# Patient Record
Sex: FEMALE | Race: BLACK OR AFRICAN AMERICAN | NOT HISPANIC OR LATINO | Employment: OTHER | URBAN - METROPOLITAN AREA
[De-identification: names, ages, dates, MRNs, and addresses within clinical notes are randomized per-mention and may not be internally consistent; named-entity substitution may affect disease eponyms.]

---

## 2017-04-11 DIAGNOSIS — H40.1192 MODERATE STAGE CHRONIC OPEN ANGLE GLAUCOMA: ICD-10-CM

## 2017-04-11 RX ORDER — LATANOPROST 50 UG/ML
SOLUTION/ DROPS OPHTHALMIC
Qty: 2.5 ML | Refills: 5 | Status: SHIPPED | OUTPATIENT
Start: 2017-04-11 | End: 2017-10-23 | Stop reason: SDUPTHER

## 2017-07-10 ENCOUNTER — OFFICE VISIT (OUTPATIENT)
Dept: OPHTHALMOLOGY | Facility: CLINIC | Age: 82
End: 2017-07-10
Payer: MEDICARE

## 2017-07-10 DIAGNOSIS — H35.9 MACULOPATHY: ICD-10-CM

## 2017-07-10 DIAGNOSIS — H40.1132 PRIMARY OPEN ANGLE GLAUCOMA OF BOTH EYES, MODERATE STAGE: Primary | ICD-10-CM

## 2017-07-10 PROCEDURE — 92133 CPTRZD OPH DX IMG PST SGM ON: CPT | Mod: S$GLB,,, | Performed by: OPHTHALMOLOGY

## 2017-07-10 PROCEDURE — 99999 PR PBB SHADOW E&M-EST. PATIENT-LVL II: CPT | Mod: PBBFAC,,, | Performed by: OPHTHALMOLOGY

## 2017-07-10 PROCEDURE — 92014 COMPRE OPH EXAM EST PT 1/>: CPT | Mod: S$GLB,,, | Performed by: OPHTHALMOLOGY

## 2017-07-10 NOTE — PROGRESS NOTES
===============================  07/10/2017   Tiffany Barnett,   89 y.o. female   Last visit [facility]: :12/9/2016   Last visit eye dept. Visit date not found  VA:  Corrected distance visual acuity was 20/50 +1 in the right eye and 20/400 in the left eye.  Tonometry     Tonometry (Applanation, 12:47 PM)       Right Left    Pressure 16 14              Wearing Rx     Wearing Rx       Sphere Cylinder Axis Add    Right -1.00 +0.50 085 +2.75    Left -1.00 Sphere  +2.75    Type:  Bifocal              Manifest Refraction     Manifest Refraction       Sphere Cylinder Axis Dist VA Add    Right -0.50 +0.50 085 20/30-2 +2.75    Left -1.00 Sphere  20/400 +2.75              Chief Complaint   Patient presents with    Glaucoma     Lost to f/u, last visit 12/9/16, Can't see as good as I used to.     Ophthalmic Medications     Ophthalmic-Intraocular Pressure Reducing Agents, Prostaglandin Analogs Start End    latanoprost 0.005 % ophthalmic solution 4/11/2017     Sig: INSTILL ONE DROP  INTO EACH EYE IN THE EVENING         HPI     Glaucoma    Additional comments: Lost to f/u, last visit 12/9/16, Can't see as good   as I used to.           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 (History)      Read Studies: y  Vitalsy  ________________  7/10/2017  Problem List Items Addressed This Visit        Eye/Vision problems    Primary open angle glaucoma - Primary    Relevant Orders    Posterior Segment OCT Optic Nerve- Both eyes (Completed)      Other Visit Diagnoses     Maculopathy            .dilated exam today stable, C:D OD 0.7-8 thin inf temp. OS 0.8 tilted disc  . bpc w orbital fat prolapse - follow   T 19 ou  goct today od rnfl low temp worse, os low but old    Continue latanoprost ou qhs, any  higher next visit, consider Cosopt    rtc 3 months, repeat vf and IOP check         ===========================

## 2017-10-23 ENCOUNTER — OFFICE VISIT (OUTPATIENT)
Dept: OPHTHALMOLOGY | Facility: CLINIC | Age: 82
End: 2017-10-23
Payer: MEDICARE

## 2017-10-23 DIAGNOSIS — H40.1132 PRIMARY OPEN ANGLE GLAUCOMA OF BOTH EYES, MODERATE STAGE: Primary | ICD-10-CM

## 2017-10-23 PROCEDURE — 99999 PR PBB SHADOW E&M-EST. PATIENT-LVL II: CPT | Mod: PBBFAC,,, | Performed by: OPHTHALMOLOGY

## 2017-10-23 PROCEDURE — 92012 INTRM OPH EXAM EST PATIENT: CPT | Mod: S$GLB,,, | Performed by: OPHTHALMOLOGY

## 2017-10-23 PROCEDURE — 92083 EXTENDED VISUAL FIELD XM: CPT | Mod: S$GLB,,, | Performed by: OPHTHALMOLOGY

## 2017-10-23 RX ORDER — DORZOLAMIDE HYDROCHLORIDE AND TIMOLOL MALEATE 20; 5 MG/ML; MG/ML
1 SOLUTION/ DROPS OPHTHALMIC 2 TIMES DAILY
Qty: 5 ML | Refills: 6 | Status: SHIPPED | OUTPATIENT
Start: 2017-10-23 | End: 2018-01-15 | Stop reason: SDUPTHER

## 2017-10-23 RX ORDER — LATANOPROST 50 UG/ML
1 SOLUTION/ DROPS OPHTHALMIC NIGHTLY
Qty: 2.5 ML | Refills: 5 | Status: SHIPPED | OUTPATIENT
Start: 2017-10-23 | End: 2018-01-15 | Stop reason: SDUPTHER

## 2017-10-23 NOTE — PROGRESS NOTES
===============================  10/24/2017   Tiffany Barnett,   89 y.o. female   Last visit [facility]: :7/10/2017   Last visit eye dept. 7/10/2017  VA:  Corrected distance visual acuity was 20/40 in the right eye and 20/400 in the left eye.   Not recorded         Not recorded         Not recorded        Chief Complaint   Patient presents with    Glaucoma     REV HVF/  3 MONTH IOP CHECK     Ophthalmic Medications     Ophthalmic-Intraocular Pressure Reducing Agents, Prostaglandin Analogs Start End    latanoprost 0.005 % ophthalmic solution 10/23/2017 10/23/2018    Sig: Place 1 drop into both eyes every evening.    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 (History)          ________________  10/23/2017  Problem List Items Addressed This Visit        Eye/Vision problems    Primary open angle glaucoma - Primary    Relevant Medications    latanoprost 0.005 % ophthalmic solution    dorzolamide-timolol 2-0.5% (COSOPT) 22.3-6.8 mg/mL ophthalmic solution    Other Relevant Orders    Gallo Visual Field - OU - Extended - Both Eyes (Completed)      od worse generalized constricton od   Os much worse but os maculopathy  Sp slt    [facility]  Add cosopt -  rtc 10 weeks    .       ============================

## 2018-01-15 ENCOUNTER — OFFICE VISIT (OUTPATIENT)
Dept: OPHTHALMOLOGY | Facility: CLINIC | Age: 83
End: 2018-01-15
Payer: MEDICARE

## 2018-01-15 DIAGNOSIS — H40.1132 PRIMARY OPEN ANGLE GLAUCOMA OF BOTH EYES, MODERATE STAGE: ICD-10-CM

## 2018-01-15 DIAGNOSIS — H40.1132 PRIMARY OPEN ANGLE GLAUCOMA (POAG) OF BOTH EYES, MODERATE STAGE: Primary | ICD-10-CM

## 2018-01-15 PROCEDURE — 99999 PR PBB SHADOW E&M-EST. PATIENT-LVL III: CPT | Mod: PBBFAC,,, | Performed by: OPHTHALMOLOGY

## 2018-01-15 PROCEDURE — 92012 INTRM OPH EXAM EST PATIENT: CPT | Mod: S$GLB,,, | Performed by: OPHTHALMOLOGY

## 2018-01-15 RX ORDER — DORZOLAMIDE HYDROCHLORIDE AND TIMOLOL MALEATE 20; 5 MG/ML; MG/ML
1 SOLUTION/ DROPS OPHTHALMIC 2 TIMES DAILY
Qty: 3 BOTTLE | Refills: 2 | Status: SHIPPED | OUTPATIENT
Start: 2018-01-15 | End: 2018-02-05 | Stop reason: RX

## 2018-01-15 RX ORDER — LATANOPROST 50 UG/ML
1 SOLUTION/ DROPS OPHTHALMIC NIGHTLY
Qty: 7.5 ML | Refills: 2 | Status: SHIPPED | OUTPATIENT
Start: 2018-01-15 | End: 2018-09-24 | Stop reason: SDUPTHER

## 2018-01-15 NOTE — PROGRESS NOTES
===============================  01/15/2018   Tiffany Barnett,   89 y.o. female   Last visit [facility]: :10/23/2017   Last visit eye dept. 10/23/2017  VA:  Corrected distance visual acuity was 20/40 in the right eye and CF at 3' in the left eye.  Tonometry     Tonometry (Applanation, 10:03 AM)       Right Left    Pressure 16 16               Not recorded         Not recorded        Chief Complaint   Patient presents with    Glaucoma     10 WEEK COAG CHECK UP/       Ophthalmic Medications     Ophthalmic-Intraocular Pressure Reducing Agents, Prostaglandin Analogs Start End    latanoprost 0.005 % ophthalmic solution 1/15/2018 1/15/2019    Sig: Place 1 drop into both eyes every evening.    Route: Both Eyes    latanoprost 0.005 % ophthalmic solution (Discontinued) 10/23/2017 1/15/2018    Sig: Place 1 drop into both eyes every evening.    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 (History)          ________________  1/15/2018  Problem List Items Addressed This Visit        Eye/Vision problems    Primary open angle glaucoma - Primary    Relevant Medications    latanoprost 0.005 % ophthalmic solution    dorzolamide-timolol 2-0.5% (COSOPT) 22.3-6.8 mg/mL ophthalmic solution      Other Visit Diagnoses     Primary open angle glaucoma of both eyes, moderate stage            T 16 16   Good!  Continue same drops (3 month supply refills sent to [facility] as per patient request)  rtc 4 months    .       ===========================

## 2018-02-05 ENCOUNTER — TELEPHONE (OUTPATIENT)
Dept: OPHTHALMOLOGY | Facility: CLINIC | Age: 83
End: 2018-02-05

## 2018-02-05 RX ORDER — DORZOLAMIDE HCL 20 MG/ML
1 SOLUTION/ DROPS OPHTHALMIC 2 TIMES DAILY
Qty: 5 ML | Refills: 5 | Status: SHIPPED | OUTPATIENT
Start: 2018-02-05 | End: 2018-09-24 | Stop reason: SDUPTHER

## 2018-02-05 RX ORDER — TIMOLOL MALEATE 5 MG/ML
1 SOLUTION/ DROPS OPHTHALMIC 2 TIMES DAILY
Qty: 5 ML | Refills: 5 | Status: SHIPPED | OUTPATIENT
Start: 2018-02-05 | End: 2018-09-24 | Stop reason: SDUPTHER

## 2018-04-20 ENCOUNTER — TELEPHONE (OUTPATIENT)
Dept: OPHTHALMOLOGY | Facility: CLINIC | Age: 83
End: 2018-04-20

## 2018-04-20 NOTE — TELEPHONE ENCOUNTER
Spoke with Mrs. Barnett's daughter, Florinda, regarding her eyes. For the last 2 days, Tiffany has been having red and itchy eyes. She's only using Latanoprost at this time and wanted to know if something should be called into the pharmacy. I explained to Florinda Dr. Ngo and his staff are out this afternoon but until I relay the message to them, she can use an over the counter drop, Zaditor, twice a day, along with AT's as needed. I will send a message to Dr. Ngo's staff to let them know what's going on just in case he may want her to come in before her scheduled appointment time. Florinda will try the Zaditor and tears and will call us Monday if there is no improvement.     ----- Message from Bonnie Garcia sent at 4/20/2018  3:32 PM CDT -----  Contact: Florinda (pt's daughter)   Florinda called and stated she needed to speak to the nurse to the nurse. She stated that the pt has red eyes and needs to know if she should schedule an appt or have something called in to her pharmacy. She can be reached at [phone] (home).    Thanks,  TF

## 2018-05-14 ENCOUNTER — OFFICE VISIT (OUTPATIENT)
Dept: OPHTHALMOLOGY | Facility: CLINIC | Age: 83
End: 2018-05-14
Payer: MEDICARE

## 2018-05-14 DIAGNOSIS — H40.1132 PRIMARY OPEN ANGLE GLAUCOMA (POAG) OF BOTH EYES, MODERATE STAGE: Primary | ICD-10-CM

## 2018-05-14 PROCEDURE — 99999 PR PBB SHADOW E&M-EST. PATIENT-LVL II: CPT | Mod: PBBFAC,,, | Performed by: OPHTHALMOLOGY

## 2018-05-14 PROCEDURE — 92012 INTRM OPH EXAM EST PATIENT: CPT | Mod: S$GLB,,, | Performed by: OPHTHALMOLOGY

## 2018-05-14 NOTE — PROGRESS NOTES
===============================  05/14/2018   Tiffany Barnett,   89 y.o. female   Last visit [facility]: :1/15/2018   Last visit eye dept. 1/15/2018  VA:  Corrected distance visual acuity was 20/30 in the right eye and CF at 3' in the left eye.  Tonometry     Tonometry (Applanation, 12:17 PM)       Right Left    Pressure 16 17               Not recorded         Not recorded        Chief Complaint   Patient presents with    Glaucoma     4 month iop check     Ophthalmic Medications     Ophthalmic - Intraocular Pressure Reducing Agents, Beta-blockers Start End    timolol maleate 0.5% (TIMOPTIC) 0.5 % Drop 2/5/2018 2/5/2019    Sig: Place 1 drop into both eyes 2 (two) times daily.    Route: Both Eyes    Ophthalmic-Intraocular Pressure Reducing Agents, Prostaglandin Analogs Start End    latanoprost 0.005 % ophthalmic solution 1/15/2018 1/15/2019    Sig: Place 1 drop into both eyes every evening.    Route: Both Eyes    Ophthalmic - Carbonic Anhydrase Inhibitors Start End    dorzolamide (TRUSOPT) 2 % ophthalmic solution 2/5/2018 2/5/2019    Sig: Place 1 drop into both eyes 2 (two) times daily.    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 (History)          ________________  5/14/2018  Problem List Items Addressed This Visit        Eye/Vision problems    Primary open angle glaucoma - Primary          .Good IOP  Continue Xalatan and cosopt  rtc 4 months iop check       ===========================

## 2018-09-24 ENCOUNTER — OFFICE VISIT (OUTPATIENT)
Dept: OPHTHALMOLOGY | Facility: CLINIC | Age: 83
End: 2018-09-24
Payer: MEDICARE

## 2018-09-24 DIAGNOSIS — H40.1132 PRIMARY OPEN ANGLE GLAUCOMA (POAG) OF BOTH EYES, MODERATE STAGE: Primary | ICD-10-CM

## 2018-09-24 PROCEDURE — 99212 OFFICE O/P EST SF 10 MIN: CPT | Mod: PBBFAC,PO | Performed by: OPHTHALMOLOGY

## 2018-09-24 PROCEDURE — 92012 INTRM OPH EXAM EST PATIENT: CPT | Mod: S$PBB,,, | Performed by: OPHTHALMOLOGY

## 2018-09-24 PROCEDURE — 99999 PR PBB SHADOW E&M-EST. PATIENT-LVL II: CPT | Mod: PBBFAC,,, | Performed by: OPHTHALMOLOGY

## 2018-09-24 RX ORDER — DORZOLAMIDE HYDROCHLORIDE AND TIMOLOL MALEATE 20; 5 MG/ML; MG/ML
1 SOLUTION/ DROPS OPHTHALMIC 2 TIMES DAILY
Qty: 10 ML | Refills: 9 | Status: SHIPPED | OUTPATIENT
Start: 2018-09-24 | End: 2018-09-24 | Stop reason: SDUPTHER

## 2018-09-24 RX ORDER — LATANOPROST 50 UG/ML
1 SOLUTION/ DROPS OPHTHALMIC NIGHTLY
Qty: 7.5 ML | Refills: 2 | Status: SHIPPED | OUTPATIENT
Start: 2018-09-24 | End: 2019-09-24

## 2018-09-24 RX ORDER — DORZOLAMIDE HYDROCHLORIDE AND TIMOLOL MALEATE 20; 5 MG/ML; MG/ML
1 SOLUTION/ DROPS OPHTHALMIC 2 TIMES DAILY
Qty: 10 ML | Refills: 9 | Status: SHIPPED | OUTPATIENT
Start: 2018-09-24 | End: 2019-10-09

## 2018-09-24 NOTE — PROGRESS NOTES
===============================  09/24/2018   Tiffany Barnett,   89 y.o. female   Last visit [facility]: :5/14/2018   Last visit eye dept. 5/14/2018  VA:  Corrected distance visual acuity was 20/25 -1 in the right eye and CF@5' in the left eye.  Tonometry     Tonometry (Applanation, 2:24 PM)       Right Left    Pressure 10 17              Wearing Rx     Wearing Rx       Sphere Cylinder Axis Add    Right -1.00 +0.50 085 +2.75    Left -1.00 Sphere  +2.75    Type:  Bifocal               Not recorded        Chief Complaint   Patient presents with    Glaucoma     4 month IOP check Latanoprost QHS OU. Per patient, she's only using one drop once a day(Xalatan). Vision is the same. No ocular pain or irritation.      Ophthalmic Medications     Ophthalmic - Intraocular Pressure Reducing Agents, Beta-blockers Start End    timolol maleate 0.5% (TIMOPTIC) 0.5 % Drop (Discontinued) 2/5/2018 9/24/2018    Sig: Place 1 drop into both eyes 2 (two) times daily.    Route: Both Eyes    Reason for Discontinue: Duplicate Order    Ophthalmic-Intraocular Pressure Reducing Agents, Prostaglandin Analogs Start End    latanoprost 0.005 % ophthalmic solution 1/15/2018 1/15/2019    Sig: Place 1 drop into both eyes every evening.    Route: Both Eyes    Ophthalmic - Carbonic Anhydrase Inhibitors Start End    dorzolamide (TRUSOPT) 2 % ophthalmic solution (Discontinued) 2/5/2018 9/24/2018    Sig: Place 1 drop into both eyes 2 (two) times daily.    Route: Both Eyes    Reason for Discontinue: Duplicate Order         HPI     Glaucoma      Additional comments: 4 month IOP check Latanoprost QHS OU. Per patient,   she's only using one drop once a day(Xalatan). Vision is the same. No   ocular pain or irritation.               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 (History)          ________________  9/24/2018  Problem List Items Addressed This Visit        Eye/Vision problems    Primary open angle glaucoma - Primary          Rt 2-3 moths  .  Was jose alfredo ware of cosopt  t 10  17  Resume cosopt     rx [facility] and cosopt ioop checkk 10 weeks     ===========================

## 2018-12-04 ENCOUNTER — OFFICE VISIT (OUTPATIENT)
Dept: OPHTHALMOLOGY | Facility: CLINIC | Age: 83
End: 2018-12-04
Payer: MEDICARE

## 2018-12-04 DIAGNOSIS — H35.3134 ADVANCED ATROPHIC NONEXUDATIVE AGE-RELATED MACULAR DEGENERATION OF BOTH EYES WITH SUBFOVEAL INVOLVEMENT: ICD-10-CM

## 2018-12-04 DIAGNOSIS — H40.1132 PRIMARY OPEN ANGLE GLAUCOMA (POAG) OF BOTH EYES, MODERATE STAGE: Primary | ICD-10-CM

## 2018-12-04 PROCEDURE — 92012 INTRM OPH EXAM EST PATIENT: CPT | Mod: S$GLB,,, | Performed by: OPHTHALMOLOGY

## 2018-12-04 PROCEDURE — 99999 PR PBB SHADOW E&M-EST. PATIENT-LVL II: CPT | Mod: PBBFAC,,, | Performed by: OPHTHALMOLOGY

## 2018-12-04 RX ORDER — ASPIRIN 325 MG
TABLET, DELAYED RELEASE (ENTERIC COATED) ORAL
COMMUNITY
Start: 2018-11-18

## 2018-12-04 NOTE — PROGRESS NOTES
===============================  12/04/2018   Tiffany Barnett,   89 y.o. female   Last visit [facility]: :9/24/2018   Last visit eye dept. 9/24/2018  VA:  Corrected distance visual acuity was 20/25 in the right eye and CF at 3' in the left eye.  Tonometry     Tonometry (Applanation, 10:42 AM)       Right Left    Pressure 18 17               Not recorded         Not recorded        Chief Complaint   Patient presents with    Glaucoma     10 week iop check     Ophthalmic Medications     Ophthalmic-Intraocular Pressure Reducing Agents, Prostaglandin Analogs Start End    latanoprost 0.005 % ophthalmic solution 9/24/2018 9/24/2019    Sig: Place 1 drop into both eyes every evening.    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

## 2019-04-22 ENCOUNTER — TELEPHONE (OUTPATIENT)
Dept: OPHTHALMOLOGY | Facility: CLINIC | Age: 84
End: 2019-04-22

## 2019-04-22 NOTE — TELEPHONE ENCOUNTER
----- Message from Alea Barragan sent at 4/22/2019  9:45 AM CDT -----  Contact: [phone]   Pt would like to reschedule appt.  Please call back at [phone]. Thx-

## 2019-04-30 ENCOUNTER — OFFICE VISIT (OUTPATIENT)
Dept: OPHTHALMOLOGY | Facility: CLINIC | Age: 84
End: 2019-04-30
Payer: MEDICARE

## 2019-04-30 DIAGNOSIS — H40.1132 PRIMARY OPEN ANGLE GLAUCOMA (POAG) OF BOTH EYES, MODERATE STAGE: Primary | ICD-10-CM

## 2019-04-30 PROCEDURE — 99999 PR PBB SHADOW E&M-EST. PATIENT-LVL II: ICD-10-PCS | Mod: PBBFAC,,, | Performed by: OPHTHALMOLOGY

## 2019-04-30 PROCEDURE — 92014 PR EYE EXAM, EST PATIENT,COMPREHESV: ICD-10-PCS | Mod: S$GLB,,, | Performed by: OPHTHALMOLOGY

## 2019-04-30 PROCEDURE — 92133 POSTERIOR SEGMENT OCT OPTIC NERVE(OCULAR COHERENCE TOMOGRAPHY) - OU - BOTH EYES: ICD-10-PCS | Mod: S$GLB,,, | Performed by: OPHTHALMOLOGY

## 2019-04-30 PROCEDURE — 92133 CPTRZD OPH DX IMG PST SGM ON: CPT | Mod: S$GLB,,, | Performed by: OPHTHALMOLOGY

## 2019-04-30 PROCEDURE — 92014 COMPRE OPH EXAM EST PT 1/>: CPT | Mod: S$GLB,,, | Performed by: OPHTHALMOLOGY

## 2019-04-30 PROCEDURE — 92083 HUMPHREY VISUAL FIELD - OU - BOTH EYES: ICD-10-PCS | Mod: S$GLB,,, | Performed by: OPHTHALMOLOGY

## 2019-04-30 PROCEDURE — 92083 EXTENDED VISUAL FIELD XM: CPT | Mod: S$GLB,,, | Performed by: OPHTHALMOLOGY

## 2019-04-30 PROCEDURE — 99999 PR PBB SHADOW E&M-EST. PATIENT-LVL II: CPT | Mod: PBBFAC,,, | Performed by: OPHTHALMOLOGY

## 2019-04-30 RX ORDER — BRIMONIDINE TARTRATE 2 MG/ML
1 SOLUTION/ DROPS OPHTHALMIC 3 TIMES DAILY
Qty: 5 ML | Refills: 5 | Status: SHIPPED | OUTPATIENT
Start: 2019-04-30 | End: 2019-10-09 | Stop reason: SDUPTHER

## 2019-04-30 NOTE — PROGRESS NOTES
===============================  04/30/2019   Tiffany Barnett,   89 y.o. female   Last visit [facility]: :12/4/2018   Last visit eye dept. Visit date not found  VA:  Corrected distance visual acuity was 20/25 in the right eye and CF at 3' in the left eye.  Tonometry     Tonometry (Applanation, 11:22 AM)       Right Left    Pressure 19 18               Not recorded         Not recorded        Chief Complaint   Patient presents with    Glaucoma     iop ck / vf review     Ophthalmic Medications     Ophthalmic-Intraocular Pressure Reducing Agents, Prostaglandin Analogs Start End     latanoprost 0.005 % ophthalmic solution    9/24/2018 9/24/2019    Sig: Place 1 drop into both eyes every evening.    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 (History)        ________________  4/30/2019  Problem List Items Addressed This Visit        Eye/Vision problems    Primary open angle glaucoma - Primary    Relevant Orders    Gallo Visual Field - OU - Extended - Both Eyes    Posterior Segment OCT Optic Nerve- Both eyes          .OD increased constriction, new jxf micro scotoma  OS marked worsening secondary to smd    Change glaucoma drops secondary worse vf   Discuss with Dr. Kowalski - he did SLT in 2014    consder os CE - guadre proegnisi for imprvemmet    Known oS MH       Revise drop duiscss w cpg then rtc to eval drops and OS lens  Add alphagan  rtc 3-4 weeks with Dr. Kowalski, brandon IOP and discuss OS Cataract Surgery  ===========================

## 2019-05-24 ENCOUNTER — OFFICE VISIT (OUTPATIENT)
Dept: OPHTHALMOLOGY | Facility: CLINIC | Age: 84
End: 2019-05-24
Payer: MEDICARE

## 2019-05-24 DIAGNOSIS — H40.1132 PRIMARY OPEN ANGLE GLAUCOMA (POAG) OF BOTH EYES, MODERATE STAGE: ICD-10-CM

## 2019-05-24 DIAGNOSIS — H25.12 NUCLEAR SCLEROSIS OF LEFT EYE: Primary | ICD-10-CM

## 2019-05-24 DIAGNOSIS — Z96.1 PSEUDOPHAKIA OF RIGHT EYE: ICD-10-CM

## 2019-05-24 PROCEDURE — 92136 OPHTHALMIC BIOMETRY: CPT | Mod: LT,S$GLB,, | Performed by: OPHTHALMOLOGY

## 2019-05-24 PROCEDURE — 99999 PR PBB SHADOW E&M-EST. PATIENT-LVL II: ICD-10-PCS | Mod: PBBFAC,,, | Performed by: OPHTHALMOLOGY

## 2019-05-24 PROCEDURE — 92012 PR EYE EXAM, EST PATIENT,INTERMED: ICD-10-PCS | Mod: S$GLB,,, | Performed by: OPHTHALMOLOGY

## 2019-05-24 PROCEDURE — 92020 PR SPECIAL EYE EVAL,GONIOSCOPY: ICD-10-PCS | Mod: S$GLB,,, | Performed by: OPHTHALMOLOGY

## 2019-05-24 PROCEDURE — 92136 IOL MASTER - OS - LEFT EYE: ICD-10-PCS | Mod: LT,S$GLB,, | Performed by: OPHTHALMOLOGY

## 2019-05-24 PROCEDURE — 99999 PR PBB SHADOW E&M-EST. PATIENT-LVL II: CPT | Mod: PBBFAC,,, | Performed by: OPHTHALMOLOGY

## 2019-05-24 PROCEDURE — 92012 INTRM OPH EXAM EST PATIENT: CPT | Mod: S$GLB,,, | Performed by: OPHTHALMOLOGY

## 2019-05-24 PROCEDURE — 92020 GONIOSCOPY: CPT | Mod: S$GLB,,, | Performed by: OPHTHALMOLOGY

## 2019-05-24 RX ORDER — GATIFLOXACIN 5 MG/ML
1 SOLUTION/ DROPS OPHTHALMIC 2 TIMES DAILY
Qty: 1 BOTTLE | Refills: 2 | Status: SHIPPED | OUTPATIENT
Start: 2019-05-24 | End: 2019-10-09

## 2019-05-24 RX ORDER — PREDNISOLONE ACETATE 10 MG/ML
1 SUSPENSION/ DROPS OPHTHALMIC 4 TIMES DAILY
Qty: 1 BOTTLE | Refills: 2 | Status: SHIPPED | OUTPATIENT
Start: 2019-05-24 | End: 2019-10-09

## 2019-05-24 RX ORDER — KETOROLAC TROMETHAMINE 5 MG/ML
1 SOLUTION OPHTHALMIC 4 TIMES DAILY
Qty: 1 BOTTLE | Refills: 2 | Status: SHIPPED | OUTPATIENT
Start: 2019-05-24 | End: 2019-10-09

## 2019-05-24 NOTE — PROGRESS NOTES
SUBJECTIVE:   Tiffany Barnett is a 89 y.o. female   Corrected distance visual acuity was 20/25 +2 in the right eye and CF at 3' in the left eye.   Chief Complaint   Patient presents with    Glaucoma        HPI:  HPI     Pt here for IOP and OS cat eval per [facility]. No pain or discomfort. VA   stable. 100% compliant with gtts.     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 (History)        Assessment /Plan :  1. Nuclear sclerosis of left eye  Visually Significant Cataract OS:   Patient reports decreased vision consistent with the clinical amount of lenticular opacity,  which reaches the level of visual significance and affects activities of daily living such as  read. Risks, benefits, and alternatives to cataract surgery were  discussed.  IOL options were discussed, including Premium IOL'S and the associated  side effects and additional patient cost associated with them as well as patient's visual  goals. The pt expressed a desire to proceed with surgery with the potential for some  reasonable degree of visual improvement and was consented.  Risks of loss of vision and eye reviewed as well as possibility of need for spectacle correction after surgery even with premium implants. Verbal and written preop  instructions were provided to the patient.       Pt is interested in traditional monofocal IOL aiming for:    Distance OU and understands that glasses will be generally needed at all times for near vision and often for finer distance correction especially for higher degrees of astigmatism.       Final visual acuity may be limited by retina and astigmatism    Pt wishes to have Phaco/IOL  OS     Requests a Monofocal IOL.    Will aim for distance    Other considerations: Corneal Precautions, Trypan Blue and Complex  Cataract     2. Pseudophakia of right eye stable   3. Primary open angle glaucoma (POAG) of both eyes, moderate stage Doing well, IOP within acceptable range relative to target IOP and no evidence of progression. Continue current treatment. Reviewed importance of continued compliance with treatment and follow up.

## 2019-06-13 ENCOUNTER — TELEPHONE (OUTPATIENT)
Dept: OPHTHALMOLOGY | Facility: CLINIC | Age: 84
End: 2019-06-13

## 2019-07-17 ENCOUNTER — OUTSIDE PLACE OF SERVICE (OUTPATIENT)
Dept: ADMINISTRATIVE | Facility: OTHER | Age: 84
End: 2019-07-17
Payer: MEDICARE

## 2019-07-17 PROCEDURE — 66982 PR REMOVAL, CATARACT, W/INSRT INTRAOC LENS, W/O ENDO CYCLO, CMPLX: ICD-10-PCS | Mod: LT,,, | Performed by: OPHTHALMOLOGY

## 2019-07-17 PROCEDURE — 66982 XCAPSL CTRC RMVL CPLX WO ECP: CPT | Mod: LT,,, | Performed by: OPHTHALMOLOGY

## 2019-07-18 ENCOUNTER — OFFICE VISIT (OUTPATIENT)
Dept: OPHTHALMOLOGY | Facility: CLINIC | Age: 84
End: 2019-07-18
Payer: MEDICARE

## 2019-07-18 DIAGNOSIS — Z98.890 POST-OPERATIVE STATE: Primary | ICD-10-CM

## 2019-07-18 PROCEDURE — 99999 PR PBB SHADOW E&M-EST. PATIENT-LVL II: ICD-10-PCS | Mod: PBBFAC,,, | Performed by: OPHTHALMOLOGY

## 2019-07-18 PROCEDURE — 99024 PR POST-OP FOLLOW-UP VISIT: ICD-10-PCS | Mod: S$GLB,,, | Performed by: OPHTHALMOLOGY

## 2019-07-18 PROCEDURE — 99999 PR PBB SHADOW E&M-EST. PATIENT-LVL II: CPT | Mod: PBBFAC,,, | Performed by: OPHTHALMOLOGY

## 2019-07-18 PROCEDURE — 99024 POSTOP FOLLOW-UP VISIT: CPT | Mod: S$GLB,,, | Performed by: OPHTHALMOLOGY

## 2019-07-18 NOTE — PROGRESS NOTES
SUBJECTIVE:   Tiffany Barnett is a 89 y.o. female   Uncorrected distance visual acuity was not recorded in the right eye and CF at 3' in the left eye.   Chief Complaint   Patient presents with    Post-op Evaluation     1 DAY PCIOL OS        HPI:  HPI     Post-op Evaluation      Additional comments: 1 DAY PCIOL OS              Comments     The patient states her left eye is feeling fine and she denies any ocular   pain at this time.  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 (History)        Assessment /Plan :  1. Post-operative state Exam:  SLE:  S/C: normal  K : trace k fold  AC: trace cell and flare  Iris: normal  Lens: PCIOL    IMP:  PO Day 1 S/P Phaco/IOL OS : Doing well.    Plan:  Continue gtts to operative eye:  Pred 1% QID  Ketorolac QID  Zymaxid BID  Reinstructed in importance of absolute compliance with Post-OP instructions including medications, shield at bedtime, and limitation of activities. Follow up appointments in approximately one and six weeks or call immediately for increased pain, redness or vision loss.

## 2019-07-25 ENCOUNTER — OFFICE VISIT (OUTPATIENT)
Dept: OPHTHALMOLOGY | Facility: CLINIC | Age: 84
End: 2019-07-25
Payer: MEDICARE

## 2019-07-25 DIAGNOSIS — Z98.890 POST-OPERATIVE STATE: Primary | ICD-10-CM

## 2019-07-25 PROCEDURE — 99999 PR PBB SHADOW E&M-EST. PATIENT-LVL II: ICD-10-PCS | Mod: PBBFAC,,, | Performed by: OPHTHALMOLOGY

## 2019-07-25 PROCEDURE — 99024 PR POST-OP FOLLOW-UP VISIT: ICD-10-PCS | Mod: S$GLB,,, | Performed by: OPHTHALMOLOGY

## 2019-07-25 PROCEDURE — 99999 PR PBB SHADOW E&M-EST. PATIENT-LVL II: CPT | Mod: PBBFAC,,, | Performed by: OPHTHALMOLOGY

## 2019-07-25 PROCEDURE — 99024 POSTOP FOLLOW-UP VISIT: CPT | Mod: S$GLB,,, | Performed by: OPHTHALMOLOGY

## 2019-07-25 NOTE — PROGRESS NOTES
SUBJECTIVE:   Tiffany Barnett is a 89 y.o. female   Uncorrected distance visual acuity was not recorded in the right eye and CF at 3' in the left eye.   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 (History)        Assessment /Plan :  1. Post-operative state Slit lamp exam:  L/L: nl  K: clear, wound sealed  AC: 0 cell and flare  Iris/Lens: IOL centered and stable      IMP:  PO Week 1 S/P Phaco/ IOL OS : Doing well with no evidence of infection or abnormal inflammation.     Plan:  Pt given and instructed in one week PO instructions. D/C zymaxid and start to taper off Ketorlac and Pred Forte 1% weekly. Can resume normal activitites and d/c eye shield. OTC reading glasses can be used until evaluated for final MR. Follow up in one month or PRN pain, redness, vision loss, or other concerns.

## 2019-08-29 ENCOUNTER — OFFICE VISIT (OUTPATIENT)
Dept: OPHTHALMOLOGY | Facility: CLINIC | Age: 84
End: 2019-08-29
Payer: MEDICARE

## 2019-08-29 DIAGNOSIS — H40.1132 PRIMARY OPEN ANGLE GLAUCOMA (POAG) OF BOTH EYES, MODERATE STAGE: ICD-10-CM

## 2019-08-29 DIAGNOSIS — Z98.890 POST-OPERATIVE STATE: Primary | ICD-10-CM

## 2019-08-29 PROCEDURE — 99024 POSTOP FOLLOW-UP VISIT: CPT | Mod: S$GLB,,, | Performed by: OPHTHALMOLOGY

## 2019-08-29 PROCEDURE — 99999 PR PBB SHADOW E&M-EST. PATIENT-LVL II: CPT | Mod: PBBFAC,,, | Performed by: OPHTHALMOLOGY

## 2019-08-29 PROCEDURE — 99024 PR POST-OP FOLLOW-UP VISIT: ICD-10-PCS | Mod: S$GLB,,, | Performed by: OPHTHALMOLOGY

## 2019-08-29 PROCEDURE — 99999 PR PBB SHADOW E&M-EST. PATIENT-LVL II: ICD-10-PCS | Mod: PBBFAC,,, | Performed by: OPHTHALMOLOGY

## 2019-08-29 RX ORDER — LATANOPROST 50 UG/ML
1 SOLUTION/ DROPS OPHTHALMIC NIGHTLY
Qty: 1 BOTTLE | Refills: 12 | Status: SHIPPED | OUTPATIENT
Start: 2019-08-29 | End: 2020-03-02 | Stop reason: SDUPTHER

## 2019-08-29 NOTE — PROGRESS NOTES
"SUBJECTIVE:   Tiffany Barnett is a 89 y.o. female   Uncorrected distance visual acuity was not recorded in the right eye and CF at 4 " in the left eye.   Chief Complaint   Patient presents with    Post-op Evaluation     S/P Phaco with IOL OS (07/17/19)        HPI:  HPI     Post-op Evaluation      Additional comments: S/P Phaco with IOL OS (07/17/19)              Comments     Patient feels OS is doing well, denies any pain or irritation and has   discontinued drops and directed.  Patient is still using Alphagan in OD.      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 (History)        Assessment /Plan :  1. Post-operative state   Exam:    Slit lamp exam:  L/L: nl  S/C: quiet and uninflamed  K: clear, wound sealed  AC: no cell or flare  Iris/Lens: IOL centered and stable      Assessment:    PO Month 1 S/P Phaco/IOL OS : Doing Well and completing healing process. Final visual correction options discussed and appropriate prescriptions and/or OTC reading glass recommendations offered to patient.    2. Primary open angle glaucoma (POAG) of both eyes, moderate stage IOP not within acceptable range relative to target IOP with risk of irreversible visual loss. Better IOP control is recommended. Discussed options, risks, and benefits of additional medication, SLT laser, and/or incisional glaucoma surgery. Reviewed importance of continued compliance with treatment and follow up.     Patient chooses Add Latanoprost qhs ou            Return to clinic in 1 month  or as needed.  With IOP Check        "

## 2019-10-09 ENCOUNTER — OFFICE VISIT (OUTPATIENT)
Dept: OPHTHALMOLOGY | Facility: CLINIC | Age: 84
End: 2019-10-09
Payer: MEDICARE

## 2019-10-09 DIAGNOSIS — H40.1132 PRIMARY OPEN ANGLE GLAUCOMA (POAG) OF BOTH EYES, MODERATE STAGE: ICD-10-CM

## 2019-10-09 DIAGNOSIS — Z96.1 PSEUDOPHAKIA: ICD-10-CM

## 2019-10-09 DIAGNOSIS — Z98.890 POST-OPERATIVE STATE: Primary | ICD-10-CM

## 2019-10-09 DIAGNOSIS — H20.9 UVEITIS: ICD-10-CM

## 2019-10-09 PROCEDURE — 99999 PR PBB SHADOW E&M-EST. PATIENT-LVL II: ICD-10-PCS | Mod: PBBFAC,,, | Performed by: OPHTHALMOLOGY

## 2019-10-09 PROCEDURE — 99999 PR PBB SHADOW E&M-EST. PATIENT-LVL II: CPT | Mod: PBBFAC,,, | Performed by: OPHTHALMOLOGY

## 2019-10-09 PROCEDURE — 99024 PR POST-OP FOLLOW-UP VISIT: ICD-10-PCS | Mod: S$GLB,,, | Performed by: OPHTHALMOLOGY

## 2019-10-09 PROCEDURE — 99024 POSTOP FOLLOW-UP VISIT: CPT | Mod: S$GLB,,, | Performed by: OPHTHALMOLOGY

## 2019-10-09 RX ORDER — PREDNISOLONE ACETATE 10 MG/ML
1 SUSPENSION/ DROPS OPHTHALMIC DAILY
Qty: 5 ML | Refills: 3 | Status: SHIPPED | OUTPATIENT
Start: 2019-10-09 | End: 2020-06-15 | Stop reason: SDUPTHER

## 2019-10-09 RX ORDER — BRIMONIDINE TARTRATE 2 MG/ML
1 SOLUTION/ DROPS OPHTHALMIC 3 TIMES DAILY
Qty: 10 ML | Refills: 12 | Status: SHIPPED | OUTPATIENT
Start: 2019-10-09 | End: 2020-11-09

## 2019-11-22 ENCOUNTER — OFFICE VISIT (OUTPATIENT)
Dept: OPHTHALMOLOGY | Facility: CLINIC | Age: 84
End: 2019-11-22
Payer: MEDICARE

## 2019-11-22 DIAGNOSIS — H40.1132 PRIMARY OPEN ANGLE GLAUCOMA (POAG) OF BOTH EYES, MODERATE STAGE: Primary | ICD-10-CM

## 2019-11-22 DIAGNOSIS — Z96.1 PSEUDOPHAKIA: ICD-10-CM

## 2019-11-22 DIAGNOSIS — H20.9 UVEITIS: ICD-10-CM

## 2019-11-22 PROCEDURE — 99999 PR PBB SHADOW E&M-EST. PATIENT-LVL II: CPT | Mod: PBBFAC,,, | Performed by: OPHTHALMOLOGY

## 2019-11-22 PROCEDURE — 99999 PR PBB SHADOW E&M-EST. PATIENT-LVL II: ICD-10-PCS | Mod: PBBFAC,,, | Performed by: OPHTHALMOLOGY

## 2019-11-22 PROCEDURE — 92012 PR EYE EXAM, EST PATIENT,INTERMED: ICD-10-PCS | Mod: S$GLB,,, | Performed by: OPHTHALMOLOGY

## 2019-11-22 PROCEDURE — 92012 INTRM OPH EXAM EST PATIENT: CPT | Mod: S$GLB,,, | Performed by: OPHTHALMOLOGY

## 2019-11-22 NOTE — PROGRESS NOTES
SUBJECTIVE:   Tiffany Barnett is a 89 y.o. female   Visual acuity was not recorded.   No chief complaint on file.       HPI:      Assessment /Plan :  1. Primary open angle glaucoma (POAG) of both eyes, moderate stage Doing well, IOP within acceptable range relative to target IOP and no evidence of progression. Continue current treatment. Reviewed importance of continued compliance with treatment and follow up.     2. Pseudophakia stable   3. Uveitis improved with current treatment   Continue:  Brimonidine TID OU  Latanoprost qhs ou  Pred Acetate qd OS    Return to clinic in 3 months  or as needed.  With IOP Check and GOCT

## 2020-02-24 ENCOUNTER — OFFICE VISIT (OUTPATIENT)
Dept: OPHTHALMOLOGY | Facility: CLINIC | Age: 85
End: 2020-02-24
Payer: MEDICARE

## 2020-02-24 DIAGNOSIS — Z96.1 PSEUDOPHAKIA: ICD-10-CM

## 2020-02-24 DIAGNOSIS — H40.1132 PRIMARY OPEN ANGLE GLAUCOMA (POAG) OF BOTH EYES, MODERATE STAGE: Primary | ICD-10-CM

## 2020-02-24 DIAGNOSIS — H20.9 UVEITIS: ICD-10-CM

## 2020-02-24 PROCEDURE — 92012 PR EYE EXAM, EST PATIENT,INTERMED: ICD-10-PCS | Mod: S$GLB,,, | Performed by: OPHTHALMOLOGY

## 2020-02-24 PROCEDURE — 92133 POSTERIOR SEGMENT OCT OPTIC NERVE(OCULAR COHERENCE TOMOGRAPHY) - OU - BOTH EYES: ICD-10-PCS | Mod: S$GLB,,, | Performed by: OPHTHALMOLOGY

## 2020-02-24 PROCEDURE — 92133 CPTRZD OPH DX IMG PST SGM ON: CPT | Mod: S$GLB,,, | Performed by: OPHTHALMOLOGY

## 2020-02-24 PROCEDURE — 99999 PR PBB SHADOW E&M-EST. PATIENT-LVL I: CPT | Mod: PBBFAC,,, | Performed by: OPHTHALMOLOGY

## 2020-02-24 PROCEDURE — 99999 PR PBB SHADOW E&M-EST. PATIENT-LVL I: ICD-10-PCS | Mod: PBBFAC,,, | Performed by: OPHTHALMOLOGY

## 2020-02-24 PROCEDURE — 92012 INTRM OPH EXAM EST PATIENT: CPT | Mod: S$GLB,,, | Performed by: OPHTHALMOLOGY

## 2020-02-24 NOTE — PROGRESS NOTES
SUBJECTIVE  Tiffany Barnett is 89 y.o. female  Corrected distance visual acuity was 20/30 in the right eye and CF at 5' in the left eye.   Chief Complaint   Patient presents with    Glaucoma     3 month IOP/ GOCT          HPI     Glaucoma      Additional comments: 3 month IOP/ GOCT              Comments     Pt states compliance with all drops as directed. Denies pain/ discomfort/   va changes since last f/u    1. Moderate COAG + (Fhx Mother) (Tmax 23/20) goal = 17  C/D 0.8/0.8  SLT OD 4/14 (22-19)  /562  2. PCIOL OD 4/04 Dr. BUCKNER  PCIOL OS 7/19 CPG (distance)  3. 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 OS x 2001 unsuccessful  4. H/o Uveitis    Brimonidine 0.2 TID OU  Latanoprost qhs ou  Pred Acetate qd OS          Last edited by Aparna Aguilar on 2/24/2020  7:56 AM. (History)         Assessment /Plan :  1. Primary open angle glaucoma (POAG) of both eyes, moderate stage will change Latanoprost to Vyzulta qhs due to progression on the GOCT OU   2. Pseudophakia  -- Condition stable, no therapeutic change required. Monitoring routinely.     3. Uveitis still improving          Return to clinic in 2 months  or as needed.  With Dilation, HVF 24-2 and SDP cpg check iop

## 2020-03-02 DIAGNOSIS — H40.1132 PRIMARY OPEN ANGLE GLAUCOMA (POAG) OF BOTH EYES, MODERATE STAGE: ICD-10-CM

## 2020-03-03 RX ORDER — TIMOLOL MALEATE 5 MG/ML
1 SOLUTION/ DROPS OPHTHALMIC EVERY MORNING
Qty: 10 ML | Refills: 6 | Status: SHIPPED | OUTPATIENT
Start: 2020-03-03 | End: 2022-05-19 | Stop reason: SDUPTHER

## 2020-03-03 RX ORDER — LATANOPROST 50 UG/ML
1 SOLUTION/ DROPS OPHTHALMIC NIGHTLY
Qty: 1 BOTTLE | Refills: 12 | Status: SHIPPED | OUTPATIENT
Start: 2020-03-03 | End: 2020-06-23

## 2020-06-11 ENCOUNTER — TELEPHONE (OUTPATIENT)
Dept: OPHTHALMOLOGY | Facility: CLINIC | Age: 85
End: 2020-06-11

## 2020-06-11 NOTE — TELEPHONE ENCOUNTER
----- Message from Shalini Reyes sent at 6/11/2020 12:22 PM CDT -----  Contact: pt  Calling to find out the name of medication patient is using. Please call daughter at  [phone]

## 2020-06-15 ENCOUNTER — OFFICE VISIT (OUTPATIENT)
Dept: OPHTHALMOLOGY | Facility: CLINIC | Age: 85
End: 2020-06-15
Payer: MEDICARE

## 2020-06-15 DIAGNOSIS — H40.1132 PRIMARY OPEN ANGLE GLAUCOMA (POAG) OF BOTH EYES, MODERATE STAGE: Primary | ICD-10-CM

## 2020-06-15 DIAGNOSIS — H20.9 UVEITIS: ICD-10-CM

## 2020-06-15 DIAGNOSIS — Z96.1 PSEUDOPHAKIA: ICD-10-CM

## 2020-06-15 PROCEDURE — 92014 PR EYE EXAM, EST PATIENT,COMPREHESV: ICD-10-PCS | Mod: S$GLB,,, | Performed by: OPHTHALMOLOGY

## 2020-06-15 PROCEDURE — 92250 FUNDUS PHOTOGRAPHY W/I&R: CPT | Mod: S$GLB,,, | Performed by: OPHTHALMOLOGY

## 2020-06-15 PROCEDURE — 92250 COLOR FUNDUS PHOTOGRAPHY - OU - BOTH EYES: ICD-10-PCS | Mod: S$GLB,,, | Performed by: OPHTHALMOLOGY

## 2020-06-15 PROCEDURE — 92014 COMPRE OPH EXAM EST PT 1/>: CPT | Mod: S$GLB,,, | Performed by: OPHTHALMOLOGY

## 2020-06-15 PROCEDURE — 99999 PR PBB SHADOW E&M-EST. PATIENT-LVL III: ICD-10-PCS | Mod: PBBFAC,,, | Performed by: OPHTHALMOLOGY

## 2020-06-15 PROCEDURE — 92083 EXTENDED VISUAL FIELD XM: CPT | Mod: S$GLB,,, | Performed by: OPHTHALMOLOGY

## 2020-06-15 PROCEDURE — 99999 PR PBB SHADOW E&M-EST. PATIENT-LVL III: CPT | Mod: PBBFAC,,, | Performed by: OPHTHALMOLOGY

## 2020-06-15 PROCEDURE — 92083 HUMPHREY VISUAL FIELD - OU - BOTH EYES: ICD-10-PCS | Mod: S$GLB,,, | Performed by: OPHTHALMOLOGY

## 2020-06-15 RX ORDER — PREDNISOLONE ACETATE 10 MG/ML
1 SUSPENSION/ DROPS OPHTHALMIC DAILY
Qty: 5 ML | Refills: 3 | Status: SHIPPED | OUTPATIENT
Start: 2020-06-15 | End: 2020-06-19 | Stop reason: SDUPTHER

## 2020-06-15 RX ORDER — LATANOPROSTENE BUNOD 0.24 MG/ML
1 SOLUTION/ DROPS OPHTHALMIC NIGHTLY
Qty: 2.5 ML | Refills: 12 | Status: SHIPPED | OUTPATIENT
Start: 2020-06-15 | End: 2020-06-15

## 2020-06-15 NOTE — PROGRESS NOTES
SUBJECTIVE  Tiffany Barnett is 89 y.o. female  Corrected distance visual acuity was 20/25 +2 in the right eye and CF at 5' in the left eye.   Chief Complaint   Patient presents with    Glaucoma     2M IOP w/ SDP          HPI     Glaucoma      Additional comments: 2M IOP w/ SDP              Comments     The patient states that her eyes are doing well and denies any pain. Pt   is still using her latanoprost although was switched to Vyzulta QHS   2/24/20.    1. Moderate COAG + (Fhx Mother) (Tmax 23/20) goal = 17  C/D 0.8/0.8  SLT OD 4/14 (22-19)  /562  2. PCIOL OD 4/04 Dr. BUCKNER  PCIOL OS 7/19 CPG (distance)  3. 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 OS x 2001 unsuccessful  4. H/o Uveitis    Brimonidine 0.2 TID OU  Latanoprost qhs ou should've d/c 2/24/20 & start using Vyzulta QHS  Pred Acetate qd OS          Last edited by Erika Gonzales on 6/15/2020 10:31 AM. (History)         Assessment /Plan :  1. Primary open angle glaucoma (POAG) of both eyes, moderate stage Doing well, IOP within acceptable range relative to target IOP and no evidence of progression. Continue current treatment. Reviewed importance of continued compliance with treatment and follow up.      Will change latanoprost to vyzulta qhs ou    2. Pseudophakia  -- Condition stable, no therapeutic change required. Monitoring routinely.     3. Uveitis             Return to clinic in 3 months  or as needed.  With IOP Check and GOCT

## 2020-06-17 ENCOUNTER — TELEPHONE (OUTPATIENT)
Dept: OPHTHALMOLOGY | Facility: CLINIC | Age: 85
End: 2020-06-17

## 2020-06-17 NOTE — TELEPHONE ENCOUNTER
----- Message from Sandrita Lenz sent at 6/17/2020  9:16 AM CDT -----  Contact: Florinda-daughter  Florinda requesting a call back regarding pt eyeglass prescription. She states that there was no paperwork with it stating that she would not have to pay. Please call Florinda back at [phone]

## 2020-06-18 ENCOUNTER — TELEPHONE (OUTPATIENT)
Dept: OPHTHALMOLOGY | Facility: CLINIC | Age: 85
End: 2020-06-18

## 2020-06-18 NOTE — TELEPHONE ENCOUNTER
----- Message from Agustina Hutchison sent at 6/18/2020 10:07 AM CDT -----  States she left a message on yesterday and no one called her back. States she's calling regarding the new medicine for her eyes. States she needs to get a form filled out so she can get the medicine free or its $400.00. Please call Florinda [place] [phone]. Thank you

## 2020-06-19 DIAGNOSIS — H40.1132 PRIMARY OPEN ANGLE GLAUCOMA (POAG) OF BOTH EYES, MODERATE STAGE: ICD-10-CM

## 2020-06-19 DIAGNOSIS — H20.9 UVEITIS: ICD-10-CM

## 2020-06-19 RX ORDER — PREDNISOLONE ACETATE 10 MG/ML
1 SUSPENSION/ DROPS OPHTHALMIC DAILY
Qty: 5 ML | Refills: 3 | Status: SHIPPED | OUTPATIENT
Start: 2020-06-19 | End: 2021-03-10 | Stop reason: SDUPTHER

## 2020-06-19 RX ORDER — LATANOPROSTENE BUNOD 0.24 MG/ML
1 SOLUTION/ DROPS OPHTHALMIC NIGHTLY
Qty: 5 ML | Refills: 12 | Status: SHIPPED | OUTPATIENT
Start: 2020-06-19 | End: 2020-06-23

## 2020-06-19 NOTE — TELEPHONE ENCOUNTER
----- Message from Shona Schuler sent at 6/19/2020 10:37 AM CDT -----  Regarding: eye medication  Contact: [facility]/ [phone] or june hood   Patient is returning a phone call.  Who left a message for the patient: navneet ruth   Does patient know what this is regarding:    Comments:   Pt daughter states they have been trying to get medicine for the pts eyes since Monday. Please call and advise

## 2020-06-29 ENCOUNTER — TELEPHONE (OUTPATIENT)
Dept: OPHTHALMOLOGY | Facility: CLINIC | Age: 85
End: 2020-06-29

## 2020-06-29 NOTE — TELEPHONE ENCOUNTER
----- Message from Michael Sheffield sent at 6/29/2020 10:13 AM CDT -----  Contact: Adelaida  Would like to consut with nurse regarding new medication.  Adelaida/daughter states the medication has turned Tiffany Barnett eyes red.  Please contact Adelaida @ [phone] or [phone].  Thanks/As

## 2020-06-29 NOTE — TELEPHONE ENCOUNTER
----- Message from Michael Sheffield sent at 6/29/2020 10:13 AM CDT -----  Contact: Adelaida  Would like to consut with nurse regarding new medication.  Adelaida/daughter states the medication has turned Tiffany Barnett eyes red.  Please contact Adelaida @ [phone] or [phone].  Thanks/As

## 2020-06-29 NOTE — TELEPHONE ENCOUNTER
Patient is complaining of redness since starting new Rocklatan drops.  No pain, itching, discharge or vision problems.  Advised that redness is common side effect, and that Dr. Kowalski would prefer that she keep using it.  Advised to call right away if itching, swollen lids or any other changes she might be concerned about.

## 2020-06-29 NOTE — TELEPHONE ENCOUNTER
----- Message from Michael Sheffield sent at 6/29/2020 10:13 AM CDT -----  Contact: Adelaida  Would like to consut with nurse regarding new medication.  Adelaida/daughter states the medication has turned Tiffany Barnett eyes red.  Please contact Adelaida @ [phone] or [phone].  Thanks/As

## 2021-03-01 ENCOUNTER — OFFICE VISIT (OUTPATIENT)
Dept: OPHTHALMOLOGY | Facility: CLINIC | Age: 86
End: 2021-03-01
Payer: MEDICARE

## 2021-03-01 DIAGNOSIS — H35.3131 EARLY DRY STAGE NONEXUDATIVE AGE-RELATED MACULAR DEGENERATION OF BOTH EYES: ICD-10-CM

## 2021-03-01 DIAGNOSIS — Z96.1 PSEUDOPHAKIA: Primary | ICD-10-CM

## 2021-03-01 DIAGNOSIS — H40.1132 PRIMARY OPEN ANGLE GLAUCOMA (POAG) OF BOTH EYES, MODERATE STAGE: ICD-10-CM

## 2021-03-01 PROCEDURE — 1126F PR PAIN SEVERITY QUANTIFIED, NO PAIN PRESENT: ICD-10-PCS | Mod: S$GLB,,, | Performed by: OPHTHALMOLOGY

## 2021-03-01 PROCEDURE — 1159F MED LIST DOCD IN RCRD: CPT | Mod: S$GLB,,, | Performed by: OPHTHALMOLOGY

## 2021-03-01 PROCEDURE — 99213 PR OFFICE/OUTPT VISIT, EST, LEVL III, 20-29 MIN: ICD-10-PCS | Mod: S$GLB,,, | Performed by: OPHTHALMOLOGY

## 2021-03-01 PROCEDURE — 1159F PR MEDICATION LIST DOCUMENTED IN MEDICAL RECORD: ICD-10-PCS | Mod: S$GLB,,, | Performed by: OPHTHALMOLOGY

## 2021-03-01 PROCEDURE — 99999 PR PBB SHADOW E&M-EST. PATIENT-LVL III: ICD-10-PCS | Mod: PBBFAC,,, | Performed by: OPHTHALMOLOGY

## 2021-03-01 PROCEDURE — 92134 CPTRZ OPH DX IMG PST SGM RTA: CPT | Mod: S$GLB,,, | Performed by: OPHTHALMOLOGY

## 2021-03-01 PROCEDURE — 99213 OFFICE O/P EST LOW 20 MIN: CPT | Mod: S$GLB,,, | Performed by: OPHTHALMOLOGY

## 2021-03-01 PROCEDURE — 99999 PR PBB SHADOW E&M-EST. PATIENT-LVL III: CPT | Mod: PBBFAC,,, | Performed by: OPHTHALMOLOGY

## 2021-03-01 PROCEDURE — 1126F AMNT PAIN NOTED NONE PRSNT: CPT | Mod: S$GLB,,, | Performed by: OPHTHALMOLOGY

## 2021-03-01 PROCEDURE — 92134 POSTERIOR SEGMENT OCT RETINA (OCULAR COHERENCE TOMOGRAPHY)-BOTH EYES: ICD-10-PCS | Mod: S$GLB,,, | Performed by: OPHTHALMOLOGY

## 2021-03-01 RX ORDER — AMPICILLIN 500 MG/1
CAPSULE ORAL
COMMUNITY
Start: 2020-12-16

## 2021-03-08 DIAGNOSIS — H40.1132 PRIMARY OPEN ANGLE GLAUCOMA (POAG) OF BOTH EYES, MODERATE STAGE: ICD-10-CM

## 2021-03-09 RX ORDER — BRIMONIDINE TARTRATE 2 MG/ML
1 SOLUTION/ DROPS OPHTHALMIC 3 TIMES DAILY
Qty: 30 ML | Refills: 4 | Status: SHIPPED | OUTPATIENT
Start: 2021-03-09 | End: 2021-03-10 | Stop reason: SDUPTHER

## 2021-03-10 DIAGNOSIS — H20.9 UVEITIS: ICD-10-CM

## 2021-03-10 DIAGNOSIS — H40.1190 PRIMARY OPEN ANGLE GLAUCOMA, UNSPECIFIED GLAUCOMA STAGE, UNSPECIFIED LATERALITY: ICD-10-CM

## 2021-03-10 DIAGNOSIS — H40.1132 PRIMARY OPEN ANGLE GLAUCOMA (POAG) OF BOTH EYES, MODERATE STAGE: ICD-10-CM

## 2021-03-10 RX ORDER — NETARSUDIL AND LATANOPROST OPHTHALMIC SOLUTION, 0.02%/0.005% .2; .05 MG/ML; MG/ML
1 SOLUTION/ DROPS OPHTHALMIC; TOPICAL NIGHTLY
Qty: 2.5 ML | Refills: 11 | Status: CANCELLED | OUTPATIENT
Start: 2021-03-10 | End: 2022-03-10

## 2021-03-12 RX ORDER — BRIMONIDINE TARTRATE 2 MG/ML
1 SOLUTION/ DROPS OPHTHALMIC 3 TIMES DAILY
Qty: 30 ML | Refills: 4 | Status: SHIPPED | OUTPATIENT
Start: 2021-03-12 | End: 2021-03-16 | Stop reason: SDUPTHER

## 2021-03-12 RX ORDER — PREDNISOLONE ACETATE 10 MG/ML
1 SUSPENSION/ DROPS OPHTHALMIC DAILY
Qty: 5 ML | Refills: 3 | Status: SHIPPED | OUTPATIENT
Start: 2021-03-12 | End: 2022-05-19

## 2021-03-16 DIAGNOSIS — H40.1132 PRIMARY OPEN ANGLE GLAUCOMA (POAG) OF BOTH EYES, MODERATE STAGE: Primary | ICD-10-CM

## 2021-03-17 RX ORDER — BRIMONIDINE TARTRATE 2 MG/ML
1 SOLUTION/ DROPS OPHTHALMIC 3 TIMES DAILY
Qty: 30 ML | Refills: 4 | Status: SHIPPED | OUTPATIENT
Start: 2021-03-17 | End: 2022-05-12

## 2021-04-09 DIAGNOSIS — H40.1190 PRIMARY OPEN ANGLE GLAUCOMA, UNSPECIFIED GLAUCOMA STAGE, UNSPECIFIED LATERALITY: ICD-10-CM

## 2021-04-09 RX ORDER — NETARSUDIL AND LATANOPROST OPHTHALMIC SOLUTION, 0.02%/0.005% .2; .05 MG/ML; MG/ML
1 SOLUTION/ DROPS OPHTHALMIC; TOPICAL NIGHTLY
Qty: 2.5 ML | Refills: 11 | Status: SHIPPED | OUTPATIENT
Start: 2021-04-09 | End: 2021-04-14 | Stop reason: SDUPTHER

## 2021-04-14 DIAGNOSIS — H40.1190 PRIMARY OPEN ANGLE GLAUCOMA, UNSPECIFIED GLAUCOMA STAGE, UNSPECIFIED LATERALITY: ICD-10-CM

## 2021-04-15 RX ORDER — NETARSUDIL AND LATANOPROST OPHTHALMIC SOLUTION, 0.02%/0.005% .2; .05 MG/ML; MG/ML
1 SOLUTION/ DROPS OPHTHALMIC; TOPICAL NIGHTLY
Qty: 2.5 ML | Refills: 11 | Status: SHIPPED | OUTPATIENT
Start: 2021-04-15 | End: 2022-09-01

## 2021-09-07 ENCOUNTER — TELEPHONE (OUTPATIENT)
Dept: OPHTHALMOLOGY | Facility: CLINIC | Age: 86
End: 2021-09-07

## 2021-09-07 RX ORDER — NEOMYCIN SULFATE, POLYMYXIN B SULFATE AND DEXAMETHASONE 3.5; 10000; 1 MG/ML; [USP'U]/ML; MG/ML
1 SUSPENSION/ DROPS OPHTHALMIC 3 TIMES DAILY
Qty: 5 ML | Refills: 1 | Status: SHIPPED | OUTPATIENT
Start: 2021-09-07 | End: 2021-09-17

## 2021-09-28 ENCOUNTER — TELEPHONE (OUTPATIENT)
Dept: OPHTHALMOLOGY | Facility: CLINIC | Age: 86
End: 2021-09-28

## 2021-11-05 ENCOUNTER — OFFICE VISIT (OUTPATIENT)
Dept: OPHTHALMOLOGY | Facility: CLINIC | Age: 86
End: 2021-11-05
Payer: MEDICARE

## 2021-11-05 DIAGNOSIS — H40.1132 PRIMARY OPEN ANGLE GLAUCOMA (POAG) OF BOTH EYES, MODERATE STAGE: Primary | ICD-10-CM

## 2021-11-05 DIAGNOSIS — H20.9 UVEITIS: ICD-10-CM

## 2021-11-05 DIAGNOSIS — Z96.1 PSEUDOPHAKIA: ICD-10-CM

## 2021-11-05 PROCEDURE — 99999 PR PBB SHADOW E&M-EST. PATIENT-LVL III: CPT | Mod: PBBFAC,,, | Performed by: OPHTHALMOLOGY

## 2021-11-05 PROCEDURE — 1160F PR REVIEW ALL MEDS BY PRESCRIBER/CLIN PHARMACIST DOCUMENTED: ICD-10-PCS | Mod: CPTII,S$GLB,, | Performed by: OPHTHALMOLOGY

## 2021-11-05 PROCEDURE — 99214 PR OFFICE/OUTPT VISIT, EST, LEVL IV, 30-39 MIN: ICD-10-PCS | Mod: S$GLB,,, | Performed by: OPHTHALMOLOGY

## 2021-11-05 PROCEDURE — 1160F RVW MEDS BY RX/DR IN RCRD: CPT | Mod: CPTII,S$GLB,, | Performed by: OPHTHALMOLOGY

## 2021-11-05 PROCEDURE — 99999 PR PBB SHADOW E&M-EST. PATIENT-LVL III: ICD-10-PCS | Mod: PBBFAC,,, | Performed by: OPHTHALMOLOGY

## 2021-11-05 PROCEDURE — 1159F PR MEDICATION LIST DOCUMENTED IN MEDICAL RECORD: ICD-10-PCS | Mod: CPTII,S$GLB,, | Performed by: OPHTHALMOLOGY

## 2021-11-05 PROCEDURE — 1159F MED LIST DOCD IN RCRD: CPT | Mod: CPTII,S$GLB,, | Performed by: OPHTHALMOLOGY

## 2021-11-05 PROCEDURE — 99214 OFFICE O/P EST MOD 30 MIN: CPT | Mod: S$GLB,,, | Performed by: OPHTHALMOLOGY

## 2021-11-05 RX ORDER — LATANOPROST 50 UG/ML
1 SOLUTION/ DROPS OPHTHALMIC NIGHTLY
Qty: 2.5 ML | Refills: 12 | Status: SHIPPED | OUTPATIENT
Start: 2021-11-05 | End: 2022-05-19 | Stop reason: SDUPTHER

## 2022-01-06 ENCOUNTER — OFFICE VISIT (OUTPATIENT)
Dept: OPHTHALMOLOGY | Facility: CLINIC | Age: 87
End: 2022-01-06
Payer: MEDICARE

## 2022-01-06 DIAGNOSIS — H40.1132 PRIMARY OPEN ANGLE GLAUCOMA (POAG) OF BOTH EYES, MODERATE STAGE: Primary | ICD-10-CM

## 2022-01-06 DIAGNOSIS — Z96.1 PSEUDOPHAKIA: ICD-10-CM

## 2022-01-06 PROCEDURE — 99999 PR PBB SHADOW E&M-EST. PATIENT-LVL III: CPT | Mod: PBBFAC,,, | Performed by: OPHTHALMOLOGY

## 2022-01-06 PROCEDURE — 99214 PR OFFICE/OUTPT VISIT, EST, LEVL IV, 30-39 MIN: ICD-10-PCS | Mod: S$GLB,,, | Performed by: OPHTHALMOLOGY

## 2022-01-06 PROCEDURE — 99999 PR PBB SHADOW E&M-EST. PATIENT-LVL III: ICD-10-PCS | Mod: PBBFAC,,, | Performed by: OPHTHALMOLOGY

## 2022-01-06 PROCEDURE — 1159F PR MEDICATION LIST DOCUMENTED IN MEDICAL RECORD: ICD-10-PCS | Mod: CPTII,S$GLB,, | Performed by: OPHTHALMOLOGY

## 2022-01-06 PROCEDURE — 1159F MED LIST DOCD IN RCRD: CPT | Mod: CPTII,S$GLB,, | Performed by: OPHTHALMOLOGY

## 2022-01-06 PROCEDURE — 1160F PR REVIEW ALL MEDS BY PRESCRIBER/CLIN PHARMACIST DOCUMENTED: ICD-10-PCS | Mod: CPTII,S$GLB,, | Performed by: OPHTHALMOLOGY

## 2022-01-06 PROCEDURE — 1160F RVW MEDS BY RX/DR IN RCRD: CPT | Mod: CPTII,S$GLB,, | Performed by: OPHTHALMOLOGY

## 2022-01-06 PROCEDURE — 99214 OFFICE O/P EST MOD 30 MIN: CPT | Mod: S$GLB,,, | Performed by: OPHTHALMOLOGY

## 2022-01-06 NOTE — PROGRESS NOTES
SUBJECTIVE  Tiffany Barnett is 89 y.o. female  Corrected distance visual acuity was 20/40 -1 in the right eye and CF at 3' in the left eye.   No chief complaint on file.         HPI     Patient reports as 2 month IOP check. Using gtts as advised. Denies pain   or irritation. Va stable.      PACH: 558/562    1. Moderate COAG + (Fhx Mother) (Tmax 23/20) goal = 17  C/D 0.8/0.8  SLT OD 4/14 (22-19)  /562  Hold Brimonidine and Rocklatan  Rocklatan (IOP 8/7 but redness)  2. PCIOL OD 4/04 Dr. BUCKNER  PCIOL OS 7/19 CPG (distance)  3. 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 OS x 2001 unsuccessful  4. H/o Uveitis    Latanoprost qhs ou      Last edited by Vidal Trejo on 1/6/2022  9:59 AM. (History)         Assessment /Plan :  1. Primary open angle glaucoma (POAG) of both eyes, moderate stage Doing well, IOP within acceptable range relative to target IOP and no evidence of progression. Continue current treatment. Reviewed importance of continued compliance with treatment and follow up.      Patient instructed to continue using the following glaucoma medication as follows:  Latanoprost one drop in each eye nightly    Return to clinic in 3-4 months  or as needed.  With Dilation, GOCT and HVF 24-2       2. Pseudophakia  -- Condition stable, no therapeutic change required. Monitoring routinely.

## 2022-05-19 ENCOUNTER — OFFICE VISIT (OUTPATIENT)
Dept: OPHTHALMOLOGY | Facility: CLINIC | Age: 87
End: 2022-05-19
Payer: MEDICARE

## 2022-05-19 DIAGNOSIS — H40.1132 PRIMARY OPEN ANGLE GLAUCOMA (POAG) OF BOTH EYES, MODERATE STAGE: Primary | ICD-10-CM

## 2022-05-19 DIAGNOSIS — Z96.1 PSEUDOPHAKIA: ICD-10-CM

## 2022-05-19 PROCEDURE — 1160F RVW MEDS BY RX/DR IN RCRD: CPT | Mod: CPTII,S$GLB,, | Performed by: OPHTHALMOLOGY

## 2022-05-19 PROCEDURE — 92133 POSTERIOR SEGMENT OCT OPTIC NERVE(OCULAR COHERENCE TOMOGRAPHY) - OU - BOTH EYES: ICD-10-PCS | Mod: S$GLB,,, | Performed by: OPHTHALMOLOGY

## 2022-05-19 PROCEDURE — 92014 COMPRE OPH EXAM EST PT 1/>: CPT | Mod: S$GLB,,, | Performed by: OPHTHALMOLOGY

## 2022-05-19 PROCEDURE — 92083 HUMPHREY VISUAL FIELD - OU - BOTH EYES: ICD-10-PCS | Mod: S$GLB,,, | Performed by: OPHTHALMOLOGY

## 2022-05-19 PROCEDURE — 1159F PR MEDICATION LIST DOCUMENTED IN MEDICAL RECORD: ICD-10-PCS | Mod: CPTII,S$GLB,, | Performed by: OPHTHALMOLOGY

## 2022-05-19 PROCEDURE — 92014 PR EYE EXAM, EST PATIENT,COMPREHESV: ICD-10-PCS | Mod: S$GLB,,, | Performed by: OPHTHALMOLOGY

## 2022-05-19 PROCEDURE — 99999 PR PBB SHADOW E&M-EST. PATIENT-LVL III: CPT | Mod: PBBFAC,,, | Performed by: OPHTHALMOLOGY

## 2022-05-19 PROCEDURE — 92083 EXTENDED VISUAL FIELD XM: CPT | Mod: S$GLB,,, | Performed by: OPHTHALMOLOGY

## 2022-05-19 PROCEDURE — 1160F PR REVIEW ALL MEDS BY PRESCRIBER/CLIN PHARMACIST DOCUMENTED: ICD-10-PCS | Mod: CPTII,S$GLB,, | Performed by: OPHTHALMOLOGY

## 2022-05-19 PROCEDURE — 99999 PR PBB SHADOW E&M-EST. PATIENT-LVL III: ICD-10-PCS | Mod: PBBFAC,,, | Performed by: OPHTHALMOLOGY

## 2022-05-19 PROCEDURE — 92133 CPTRZD OPH DX IMG PST SGM ON: CPT | Mod: S$GLB,,, | Performed by: OPHTHALMOLOGY

## 2022-05-19 PROCEDURE — 1159F MED LIST DOCD IN RCRD: CPT | Mod: CPTII,S$GLB,, | Performed by: OPHTHALMOLOGY

## 2022-05-19 RX ORDER — TIMOLOL MALEATE 5 MG/ML
1 SOLUTION/ DROPS OPHTHALMIC EVERY 12 HOURS
Qty: 5 ML | Refills: 12 | Status: SHIPPED | OUTPATIENT
Start: 2022-05-19 | End: 2023-07-06

## 2022-05-19 RX ORDER — LATANOPROST 50 UG/ML
1 SOLUTION/ DROPS OPHTHALMIC NIGHTLY
Qty: 2.5 ML | Refills: 12 | Status: SHIPPED | OUTPATIENT
Start: 2022-05-19 | End: 2022-11-28

## 2022-05-19 NOTE — PROGRESS NOTES
SUBJECTIVE  Tiffany Barnett is 89 y.o. female  Corrected distance visual acuity was 20/25 -2 in the right eye and CF at 6' in the left eye.   Chief Complaint   Patient presents with    Glaucoma     IOP check with HVF and GOCT with DFE    Patient states at times OU has a throbbing pain that comes and goes like a head ache behind OU, using drops as directed.          HPI     Glaucoma      Additional comments: IOP check with HVF and GOCT with DFE    Patient states at times OU has a throbbing pain that comes and goes like a   head ache behind OU, using drops as directed.              Comments     1. Moderate COAG + (Fhx Mother) (Tmax 23/20) goal = 17  C/D 0.8/0.8  SLT OD 4/14 (22-19)  /562  Hold Brimonidine and Rocklatan due to redness  Rocklatan (IOP 8/7 but redness)  2. PCIOL OD 4/04 Dr. BUCKNER  PCIOL OS 7/19 CPG (distance)  3. 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 OS x 2001 unsuccessful  4. H/o Uveitis    Latanoprost qhs ou          Last edited by Kanika Rogel, Patient Care Assistant on 5/19/2022  9:59   AM. (History)         Assessment /Plan :  1. Primary open angle glaucoma (POAG) of both eyes, moderate stage IOP not within acceptable range relative to target IOP with risk of irreversible visual loss. Better IOP control is recommended. Discussed options, risks, and benefits of additional medication, SLT laser, and/or incisional glaucoma surgery. Reviewed importance of continued compliance with treatment and follow up.     Patient chooses to add Timolol one drop in each eye every 12 hours. Continue Latanoprost one drop in each eye every night    Return to clinic in 3 months  or as needed.  With IOP Check     2. Pseudophakia  -- Condition stable, no therapeutic change required. Monitoring routinely.

## 2022-09-12 ENCOUNTER — OFFICE VISIT (OUTPATIENT)
Dept: OPHTHALMOLOGY | Facility: CLINIC | Age: 87
End: 2022-09-12
Payer: MEDICARE

## 2022-09-12 DIAGNOSIS — H40.1132 PRIMARY OPEN ANGLE GLAUCOMA (POAG) OF BOTH EYES, MODERATE STAGE: Primary | ICD-10-CM

## 2022-09-12 DIAGNOSIS — Z96.1 PSEUDOPHAKIA: ICD-10-CM

## 2022-09-12 PROCEDURE — 99213 OFFICE O/P EST LOW 20 MIN: CPT | Mod: PBBFAC | Performed by: OPHTHALMOLOGY

## 2022-09-12 PROCEDURE — 99999 PR PBB SHADOW E&M-EST. PATIENT-LVL III: CPT | Mod: PBBFAC,,, | Performed by: OPHTHALMOLOGY

## 2022-09-12 PROCEDURE — 1159F PR MEDICATION LIST DOCUMENTED IN MEDICAL RECORD: ICD-10-PCS | Mod: CPTII,S$GLB,, | Performed by: OPHTHALMOLOGY

## 2022-09-12 PROCEDURE — 1160F RVW MEDS BY RX/DR IN RCRD: CPT | Mod: CPTII,S$GLB,, | Performed by: OPHTHALMOLOGY

## 2022-09-12 PROCEDURE — 1159F MED LIST DOCD IN RCRD: CPT | Mod: CPTII,S$GLB,, | Performed by: OPHTHALMOLOGY

## 2022-09-12 PROCEDURE — 99214 OFFICE O/P EST MOD 30 MIN: CPT | Mod: S$GLB,,, | Performed by: OPHTHALMOLOGY

## 2022-09-12 PROCEDURE — 99999 PR PBB SHADOW E&M-EST. PATIENT-LVL III: ICD-10-PCS | Mod: PBBFAC,,, | Performed by: OPHTHALMOLOGY

## 2022-09-12 PROCEDURE — 99214 PR OFFICE/OUTPT VISIT, EST, LEVL IV, 30-39 MIN: ICD-10-PCS | Mod: S$GLB,,, | Performed by: OPHTHALMOLOGY

## 2022-09-12 PROCEDURE — 1160F PR REVIEW ALL MEDS BY PRESCRIBER/CLIN PHARMACIST DOCUMENTED: ICD-10-PCS | Mod: CPTII,S$GLB,, | Performed by: OPHTHALMOLOGY

## 2022-09-12 RX ORDER — LATANOPROST 50 UG/ML
1 SOLUTION/ DROPS OPHTHALMIC DAILY
Qty: 2.5 ML | Refills: 1 | Status: CANCELLED | OUTPATIENT
Start: 2022-09-12 | End: 2023-09-12

## 2022-09-12 RX ORDER — TIMOLOL MALEATE 5 MG/ML
1 SOLUTION/ DROPS OPHTHALMIC 2 TIMES DAILY
Qty: 15 ML | Refills: 1 | Status: CANCELLED | OUTPATIENT
Start: 2022-09-12 | End: 2023-09-12

## 2022-09-12 NOTE — PROGRESS NOTES
SUBJECTIVE  Tiffany Barnett is 89 y.o. female  Corrected distance visual acuity was 20/30 in the right eye and CF at 5' in the left eye.   Chief Complaint   Patient presents with    Glaucoma     Patient reports for 3 month IOP check. Using gtts as advised. Denies pain or irritation at this time. Patient reports of visual stability since previous visit.             HPI     Glaucoma     Additional comments: Patient reports for 3 month IOP check. Using gtts as   advised. Denies pain or irritation at this time. Patient reports of visual   stability since previous visit.              Comments    PACH: 558/562    1. Moderate COAG + (Fhx Mother) (Tmax 23/20) goal = 17  C/D 0.8/0.8  SLT OD 4/14 (22-19)  /562  Hold Brimonidine and Rocklatan due to redness  Rocklatan (IOP 8/7 but redness)  2. PCIOL OD 4/04 Dr. BUCKNER  PCIOL OS 7/19 CPG (distance)  3. 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 OS x 2001 unsuccessful  4. H/o Uveitis    Latanoprost qhs OU  Timolol BID OU            Last edited by Vidal Trejo on 9/12/2022  9:23 AM.         Assessment /Plan :  1. Primary open angle glaucoma (POAG) of both eyes, moderate stage Doing well, intraocular pressure (IOP) within acceptable range relative to target IOP and no evidence of progression. Continue current treatment. Reviewed importance of continued compliance with treatment and follow up.      Patient instructed to continue using the following glaucoma medication as follows:  Latanoprost one drop in each eye nightly    Hold Timolol for now    Return to clinic in 4 months  or as needed.  With IOP Check and GOCT    2. Pseudophakia

## 2022-09-20 ENCOUNTER — TELEPHONE (OUTPATIENT)
Dept: OPHTHALMOLOGY | Facility: CLINIC | Age: 87
End: 2022-09-20
Payer: MEDICAID

## 2022-09-20 NOTE — TELEPHONE ENCOUNTER
Per Dr. Kowalski's last note, the patient is to hold the Timolol eye drops. The patient is to use Latanoprost one drop in each eye every night.

## 2022-09-20 NOTE — TELEPHONE ENCOUNTER
----- Message from Agustina Hutchison sent at 9/20/2022  1:42 PM CDT -----  States she is calling to see if Dr Kowalski has her on two eye drops or just one. Please call pt Florinda Glober [phone]. Thank you

## 2023-02-13 ENCOUNTER — OFFICE VISIT (OUTPATIENT)
Dept: OPHTHALMOLOGY | Facility: CLINIC | Age: 88
End: 2023-02-13
Payer: MEDICARE

## 2023-02-13 DIAGNOSIS — H35.342 MACULAR HOLE OF LEFT EYE: ICD-10-CM

## 2023-02-13 DIAGNOSIS — H35.372 EPIRETINAL MEMBRANE (ERM) OF LEFT EYE: ICD-10-CM

## 2023-02-13 DIAGNOSIS — H40.1132 PRIMARY OPEN ANGLE GLAUCOMA (POAG) OF BOTH EYES, MODERATE STAGE: Primary | ICD-10-CM

## 2023-02-13 DIAGNOSIS — Z96.1 PSEUDOPHAKIA: ICD-10-CM

## 2023-02-13 PROCEDURE — 1160F PR REVIEW ALL MEDS BY PRESCRIBER/CLIN PHARMACIST DOCUMENTED: ICD-10-PCS | Mod: CPTII,S$GLB,, | Performed by: OPHTHALMOLOGY

## 2023-02-13 PROCEDURE — 99999 PR PBB SHADOW E&M-EST. PATIENT-LVL III: CPT | Mod: PBBFAC,,, | Performed by: OPHTHALMOLOGY

## 2023-02-13 PROCEDURE — 99999 PR PBB SHADOW E&M-EST. PATIENT-LVL III: ICD-10-PCS | Mod: PBBFAC,,, | Performed by: OPHTHALMOLOGY

## 2023-02-13 PROCEDURE — 99214 OFFICE O/P EST MOD 30 MIN: CPT | Mod: S$GLB,,, | Performed by: OPHTHALMOLOGY

## 2023-02-13 PROCEDURE — 92133 CPTRZD OPH DX IMG PST SGM ON: CPT | Mod: S$GLB,,, | Performed by: OPHTHALMOLOGY

## 2023-02-13 PROCEDURE — 92133 POSTERIOR SEGMENT OCT OPTIC NERVE(OCULAR COHERENCE TOMOGRAPHY) - OU - BOTH EYES: ICD-10-PCS | Mod: S$GLB,,, | Performed by: OPHTHALMOLOGY

## 2023-02-13 PROCEDURE — 1160F RVW MEDS BY RX/DR IN RCRD: CPT | Mod: CPTII,S$GLB,, | Performed by: OPHTHALMOLOGY

## 2023-02-13 PROCEDURE — 1159F PR MEDICATION LIST DOCUMENTED IN MEDICAL RECORD: ICD-10-PCS | Mod: CPTII,S$GLB,, | Performed by: OPHTHALMOLOGY

## 2023-02-13 PROCEDURE — 99214 PR OFFICE/OUTPT VISIT, EST, LEVL IV, 30-39 MIN: ICD-10-PCS | Mod: S$GLB,,, | Performed by: OPHTHALMOLOGY

## 2023-02-13 PROCEDURE — 1159F MED LIST DOCD IN RCRD: CPT | Mod: CPTII,S$GLB,, | Performed by: OPHTHALMOLOGY

## 2023-02-13 RX ORDER — TIMOLOL MALEATE 5 MG/ML
1 SOLUTION/ DROPS OPHTHALMIC EVERY MORNING
Qty: 5 ML | Refills: 12 | Status: SHIPPED | OUTPATIENT
Start: 2023-02-13 | End: 2023-07-06

## 2023-02-13 NOTE — PROGRESS NOTES
SUBJECTIVE  Tiffany Barnett is 89 y.o. female  Visual acuity was not recorded.   Chief Complaint   Patient presents with    Glaucoma     Patient reports for 4 month IOP check. Using gtts as advised. Denies pain or irritation at this time. Patient reports of visual stability since previous visit.             HPI     Glaucoma     Additional comments: Patient reports for 4 month IOP check. Using gtts as   advised. Denies pain or irritation at this time. Patient reports of visual   stability since previous visit.              Comments    PACH: 558/562    1. Moderate COAG + (Fhx Mother) (Tmax 23/20) goal = 17  C/D 0.8/0.8  SLT OD 4/14 (22-19)  /562  Hold Brimonidine and Rocklatan due to redness  Rocklatan (IOP 8/7 but redness)  2. PCIOL OD 4/04 Dr. BUCKNER  PCIOL OS 7/19 CPG (distance)  3. 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 OS x 2001 unsuccessful  4. H/o Uveitis    Latanoprost qhs OU              Last edited by Vidal Trejo on 2/13/2023  9:59 AM.         Assessment /Plan :  1. Primary open angle glaucoma (POAG) of both eyes, moderate stage Intraocular pressure (IOP) not within acceptable range relative to target IOP with risk of irreversible visual loss. Better IOP control is recommended. Treatment options may include change or additional medications, Selective Laser Trabeculoplasty (SLT laser), and/or incisional glaucoma surgery. Reviewed importance of continued compliance with treatment and follow up.     Patient chooses  to resume Timolol one drop in each eye every morning and continue Latanoprost one drop in each eye every night    Return to clinic in 4 months  or as needed.  With Dilation and HVF 24-2     2. Pseudophakia  -- Condition stable, no therapeutic change required. Monitoring routinely.     3. Macular hole of left eye - monitor for now   4. Epiretinal membrane (ERM) of left eye - monitor for now

## 2023-05-07 DIAGNOSIS — H40.1132 PRIMARY OPEN ANGLE GLAUCOMA (POAG) OF BOTH EYES, MODERATE STAGE: ICD-10-CM

## 2023-05-08 RX ORDER — LATANOPROST 50 UG/ML
SOLUTION/ DROPS OPHTHALMIC
Qty: 3 ML | Refills: 0 | Status: SHIPPED | OUTPATIENT
Start: 2023-05-08 | End: 2023-07-06 | Stop reason: SDUPTHER

## 2023-07-06 ENCOUNTER — OFFICE VISIT (OUTPATIENT)
Dept: OPHTHALMOLOGY | Facility: CLINIC | Age: 88
End: 2023-07-06
Payer: MEDICARE

## 2023-07-06 DIAGNOSIS — H40.1132 PRIMARY OPEN ANGLE GLAUCOMA (POAG) OF BOTH EYES, MODERATE STAGE: Primary | ICD-10-CM

## 2023-07-06 DIAGNOSIS — H52.7 REFRACTIVE ERROR: ICD-10-CM

## 2023-07-06 DIAGNOSIS — Z96.1 PSEUDOPHAKIA: ICD-10-CM

## 2023-07-06 PROCEDURE — 92014 COMPRE OPH EXAM EST PT 1/>: CPT | Mod: S$GLB,,, | Performed by: OPHTHALMOLOGY

## 2023-07-06 PROCEDURE — 92014 PR EYE EXAM, EST PATIENT,COMPREHESV: ICD-10-PCS | Mod: S$GLB,,, | Performed by: OPHTHALMOLOGY

## 2023-07-06 PROCEDURE — 99999 PR PBB SHADOW E&M-EST. PATIENT-LVL III: CPT | Mod: PBBFAC,,, | Performed by: OPHTHALMOLOGY

## 2023-07-06 PROCEDURE — 1159F MED LIST DOCD IN RCRD: CPT | Mod: CPTII,S$GLB,, | Performed by: OPHTHALMOLOGY

## 2023-07-06 PROCEDURE — 92015 PR REFRACTION: ICD-10-PCS | Mod: S$GLB,,, | Performed by: OPHTHALMOLOGY

## 2023-07-06 PROCEDURE — 99999 PR PBB SHADOW E&M-EST. PATIENT-LVL III: ICD-10-PCS | Mod: PBBFAC,,, | Performed by: OPHTHALMOLOGY

## 2023-07-06 PROCEDURE — 1160F RVW MEDS BY RX/DR IN RCRD: CPT | Mod: CPTII,S$GLB,, | Performed by: OPHTHALMOLOGY

## 2023-07-06 PROCEDURE — 1160F PR REVIEW ALL MEDS BY PRESCRIBER/CLIN PHARMACIST DOCUMENTED: ICD-10-PCS | Mod: CPTII,S$GLB,, | Performed by: OPHTHALMOLOGY

## 2023-07-06 PROCEDURE — 92015 DETERMINE REFRACTIVE STATE: CPT | Mod: S$GLB,,, | Performed by: OPHTHALMOLOGY

## 2023-07-06 PROCEDURE — 1159F PR MEDICATION LIST DOCUMENTED IN MEDICAL RECORD: ICD-10-PCS | Mod: CPTII,S$GLB,, | Performed by: OPHTHALMOLOGY

## 2023-07-06 PROCEDURE — 92083 HUMPHREY VISUAL FIELD - OU - BOTH EYES: ICD-10-PCS | Mod: S$GLB,,, | Performed by: OPHTHALMOLOGY

## 2023-07-06 PROCEDURE — 92083 EXTENDED VISUAL FIELD XM: CPT | Mod: S$GLB,,, | Performed by: OPHTHALMOLOGY

## 2023-07-06 RX ORDER — LATANOPROST 50 UG/ML
1 SOLUTION/ DROPS OPHTHALMIC NIGHTLY
Qty: 3 ML | Refills: 12 | Status: SHIPPED | OUTPATIENT
Start: 2023-07-06

## 2023-07-06 RX ORDER — DORZOLAMIDE HYDROCHLORIDE AND TIMOLOL MALEATE 20; 5 MG/ML; MG/ML
1 SOLUTION/ DROPS OPHTHALMIC EVERY 12 HOURS
Qty: 10 ML | Refills: 12 | Status: SHIPPED | OUTPATIENT
Start: 2023-07-06

## 2023-07-06 NOTE — PROGRESS NOTES
SUBJECTIVE  Tiffany Barnett is 89 y.o. female  Uncorrected distance visual acuity was 20/25-2 in the right eye and CF at 4' in the left eye.   Chief Complaint   Patient presents with    Glaucoma     COAG HVF and dilation           HPI     Glaucoma     Additional comments: COAG HVF and dilation            Comments    States that her vision is stable and that she has been compliant with gtts   as directed. Would like an updated glasses Rx.     1. Moderate COAG + (Fhx Mother) (Tmax 23/20) goal = 17  C/D 0.8/0.8  SLT OD 4/14 (22-19)  /562  Hold Brimonidine and Rocklatan due to redness  Rocklatan (IOP 8/7 but redness)  2. PCIOL OD 4/04 Dr. BUCKNER  PCIOL OS 7/19 CPG (distance)  3. 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 OS x 2001 unsuccessful  4. H/o Uveitis    Latanoprost qhs OU  Timolol BID OU            Last edited by Rebeca Sethi on 7/6/2023  2:26 PM.         Assessment /Plan :  1. Primary open angle glaucoma (POAG) of both eyes, moderate stage Intraocular pressure (IOP) not within acceptable range relative to target IOP with risk of irreversible visual loss. Better IOP control is recommended. Treatment options may include change or additional medications, Selective Laser Trabeculoplasty (SLT laser), and/or incisional glaucoma surgery. Reviewed importance of continued compliance with treatment and follow up.     Patient chooses  to change Timolol to Dorzolamide/Timolol one drop in each eye 2 times a day. Continue Latanoprost one drop in each eye every night    Return to clinic in 6 weeks  or as needed.  With IOP Check     2. Pseudophakia  -- Condition stable, no therapeutic change required. Monitoring routinely.     3.     Refractive Error - bifocal rx

## 2023-10-26 ENCOUNTER — OFFICE VISIT (OUTPATIENT)
Dept: OPHTHALMOLOGY | Facility: CLINIC | Age: 88
End: 2023-10-26
Payer: MEDICARE

## 2023-10-26 DIAGNOSIS — H40.1132 PRIMARY OPEN ANGLE GLAUCOMA (POAG) OF BOTH EYES, MODERATE STAGE: Primary | ICD-10-CM

## 2023-10-26 DIAGNOSIS — Z96.1 PSEUDOPHAKIA: ICD-10-CM

## 2023-10-26 PROCEDURE — 99999 PR PBB SHADOW E&M-EST. PATIENT-LVL III: ICD-10-PCS | Mod: PBBFAC,,, | Performed by: OPHTHALMOLOGY

## 2023-10-26 PROCEDURE — 1160F RVW MEDS BY RX/DR IN RCRD: CPT | Mod: CPTII,S$GLB,, | Performed by: OPHTHALMOLOGY

## 2023-10-26 PROCEDURE — 1159F MED LIST DOCD IN RCRD: CPT | Mod: CPTII,S$GLB,, | Performed by: OPHTHALMOLOGY

## 2023-10-26 PROCEDURE — 1160F PR REVIEW ALL MEDS BY PRESCRIBER/CLIN PHARMACIST DOCUMENTED: ICD-10-PCS | Mod: CPTII,S$GLB,, | Performed by: OPHTHALMOLOGY

## 2023-10-26 PROCEDURE — 99214 PR OFFICE/OUTPT VISIT, EST, LEVL IV, 30-39 MIN: ICD-10-PCS | Mod: S$GLB,,, | Performed by: OPHTHALMOLOGY

## 2023-10-26 PROCEDURE — 99999 PR PBB SHADOW E&M-EST. PATIENT-LVL III: CPT | Mod: PBBFAC,,, | Performed by: OPHTHALMOLOGY

## 2023-10-26 PROCEDURE — 1159F PR MEDICATION LIST DOCUMENTED IN MEDICAL RECORD: ICD-10-PCS | Mod: CPTII,S$GLB,, | Performed by: OPHTHALMOLOGY

## 2023-10-26 PROCEDURE — 99214 OFFICE O/P EST MOD 30 MIN: CPT | Mod: S$GLB,,, | Performed by: OPHTHALMOLOGY

## 2023-10-26 NOTE — PROGRESS NOTES
SUBJECTIVE  Tiffany Barnett is 89 y.o. female  Corrected distance visual acuity was 20/30 +1 in the right eye and CF at 3' in the left eye.   Chief Complaint   Patient presents with    Glaucoma     Pt here for 6 wk Dorzolamide/Timolol trial. No pain or discomfort. VA stable. 100% compliant with gtts.           HPI     Glaucoma     Additional comments: Pt here for 6 wk Dorzolamide/Timolol trial. No pain   or discomfort. VA stable. 100% compliant with gtts.            Comments    1. Moderate COAG + (Fhx Mother) (Tmax 23/20) goal = 17  C/D 0.8/0.8  SLT OD 4/14 (22-19)  /562  Hold Brimonidine and Rocklatan due to redness  Rocklatan (IOP 8/7 but redness)  2. PCIOL OD 4/04 Dr. BUCKNER  PCIOL OS 7/19 CPG (distance)  3. 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 OS x 2001 unsuccessful  4. H/o Uveitis    Latanoprost qhs OU  Dorzolamide/Timolol BID OU          Last edited by Sherwin Pat MA on 10/26/2023 11:26 AM.         Assessment /Plan :  1. Primary open angle glaucoma (POAG) of both eyes, moderate stage Doing well, intraocular pressure (IOP) within acceptable range relative to target IOP and no evidence of progression. Continue current treatment. Reviewed importance of continued compliance with treatment and follow up.    Slightly elevated pressure due to not taking Dorzolamide/Timolol this morning.    Patient instructed to continue using the following glaucoma medication as follows:  Latanoprost one drop in each eye nightly and Dorzolamide/Timolol (Cosopt) one drop in each eye every 12 hours    Return to clinic in 3-4 months  or as needed.  With IOP Check and GOCT     2. Pseudophakia  -- Condition stable, no therapeutic change required. Monitoring routinely.

## 2024-01-23 ENCOUNTER — TELEPHONE (OUTPATIENT)
Dept: OPHTHALMOLOGY | Facility: CLINIC | Age: 89
End: 2024-01-23
Payer: MEDICAID

## 2024-01-23 NOTE — TELEPHONE ENCOUNTER
I CALLED PT AND RESCHEDULED HER TO ANOTHER DATE AS PER HER REQUEST     ----- Message from Tri Lamb sent at 1/23/2024  1:43 PM CST -----  Contact: Florinda  Type:  Patient Call          Who Called: patient daughter         Does the patient know what this is regarding?: Requesting a call back to reschedule pt appt ; please advise           Would the patient rather a call back or a response via [facility]? Call           Best Call Back Number: [phone]              Additional Information:

## 2024-02-08 ENCOUNTER — OFFICE VISIT (OUTPATIENT)
Dept: OPHTHALMOLOGY | Facility: CLINIC | Age: 89
End: 2024-02-08
Payer: MEDICARE

## 2024-02-08 DIAGNOSIS — Z96.1 PSEUDOPHAKIA: ICD-10-CM

## 2024-02-08 DIAGNOSIS — H40.1132 PRIMARY OPEN ANGLE GLAUCOMA (POAG) OF BOTH EYES, MODERATE STAGE: Primary | ICD-10-CM

## 2024-02-08 PROCEDURE — 1159F MED LIST DOCD IN RCRD: CPT | Mod: CPTII,S$GLB,, | Performed by: OPHTHALMOLOGY

## 2024-02-08 PROCEDURE — 99999 PR PBB SHADOW E&M-EST. PATIENT-LVL III: CPT | Mod: PBBFAC,,, | Performed by: OPHTHALMOLOGY

## 2024-02-08 PROCEDURE — 1126F AMNT PAIN NOTED NONE PRSNT: CPT | Mod: CPTII,S$GLB,, | Performed by: OPHTHALMOLOGY

## 2024-02-08 PROCEDURE — 1160F RVW MEDS BY RX/DR IN RCRD: CPT | Mod: CPTII,S$GLB,, | Performed by: OPHTHALMOLOGY

## 2024-02-08 PROCEDURE — 99214 OFFICE O/P EST MOD 30 MIN: CPT | Mod: S$GLB,,, | Performed by: OPHTHALMOLOGY

## 2024-02-08 PROCEDURE — 92133 CPTRZD OPH DX IMG PST SGM ON: CPT | Mod: S$GLB,,, | Performed by: OPHTHALMOLOGY

## 2024-02-08 NOTE — PROGRESS NOTES
SUBJECTIVE  Tiffany Barnett is 89 y.o. female  Corrected distance visual acuity was 20/30 +1 in the right eye and CF at 3' in the left eye.   Chief Complaint   Patient presents with    Glaucoma     3-4 month POAG follow up with GOCT and IOP check. VA is doing well, no changes. No pain or irritation. Compliant with gtts.          HPI     Glaucoma     Additional comments: 3-4 month POAG follow up with GOCT and IOP check. VA   is doing well, no changes. No pain or irritation. Compliant with gtts.           Comments    PACH: 558/562    1. Moderate COAG + (Fhx Mother) (Tmax 23/20) goal = 17  C/D 0.8/0.8  SLT OD 4/14 (22-19)  /562  Hold Brimonidine and Rocklatan due to redness  Rocklatan (IOP 8/7 but redness)  2. PCIOL OD 4/04 Dr. BUCKNER  PCIOL OS 7/19 CPG (distance)  3. 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 OS x 2001 unsuccessful  4. H/o Uveitis    Latanoprost qhs OU  Dorzolamide/Timolol BID OU             Last edited by Jake Escalante on 2/8/2024  2:06 PM.         Assessment /Plan :  1. Primary open angle glaucoma (POAG) of both eyes, moderate stage Intraocular pressure (IOP) not within acceptable range relative to target IOP with risk of irreversible visual loss. Better IOP control is recommended. Treatment options may include change or additional medications, Selective Laser Trabeculoplasty (SLT laser), and/or incisional glaucoma surgery. Reviewed importance of continued compliance with treatment and follow up.     Patient chooses defer and recheck IOP next visit  Patient will consider the SLT.  Continue Latanoprost one drop in each eye every night and Dorzolamide/Timolol one drop in each eye 2 times a day.  Return to clinic in 4 months  or as needed.  With IOP Check     2. Pseudophakia  -- Condition stable, no therapeutic change required. Monitoring routinely.

## 2024-06-13 ENCOUNTER — OFFICE VISIT (OUTPATIENT)
Dept: OPHTHALMOLOGY | Facility: CLINIC | Age: 89
End: 2024-06-13
Payer: MEDICARE

## 2024-06-13 DIAGNOSIS — H40.1132 PRIMARY OPEN ANGLE GLAUCOMA (POAG) OF BOTH EYES, MODERATE STAGE: Primary | ICD-10-CM

## 2024-06-13 DIAGNOSIS — Z96.1 PSEUDOPHAKIA: ICD-10-CM

## 2024-06-13 PROCEDURE — 1160F RVW MEDS BY RX/DR IN RCRD: CPT | Mod: CPTII,S$GLB,, | Performed by: OPHTHALMOLOGY

## 2024-06-13 PROCEDURE — 1159F MED LIST DOCD IN RCRD: CPT | Mod: CPTII,S$GLB,, | Performed by: OPHTHALMOLOGY

## 2024-06-13 PROCEDURE — 99214 OFFICE O/P EST MOD 30 MIN: CPT | Mod: S$GLB,,, | Performed by: OPHTHALMOLOGY

## 2024-06-13 PROCEDURE — 99999 PR PBB SHADOW E&M-EST. PATIENT-LVL III: CPT | Mod: PBBFAC,,, | Performed by: OPHTHALMOLOGY

## 2024-06-13 NOTE — PROGRESS NOTES
SUBJECTIVE  Tiffany Barnett is 89 y.o. female  Corrected distance visual acuity was 20/25 +1 in the right eye and CF at 3' in the left eye.   Chief Complaint   Patient presents with    Glaucoma     Pt reports for 4m IOP check. Denies any pain or irritation. Va stable. 100% compliant with gtts.           HPI     Glaucoma     Additional comments: Pt reports for 4m IOP check. Denies any pain or   irritation. Va stable. 100% compliant with gtts.            Comments    1. Moderate COAG + (Fhx Mother) (Tmax 23/20) goal = 17  C/D 0.8/0.8  SLT OD 4/14 (22-19)  /562  Hold Brimonidine and Rocklatan due to redness  Rocklatan (IOP 8/7 but redness)  2. PCIOL OD 4/04 Dr. BUCKNER  PCIOL OS 7/19 CPG (distance)  3. 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 OS x 2001 unsuccessful  4. H/o Uveitis    Latanoprost qhs OU  Dorzolamide/Timolol BID OU             Last edited by Angelito Varma on 6/13/2024  1:49 PM.         Assessment /Plan :  1. Primary open angle glaucoma (POAG) of both eyes, moderate stage Doing well, intraocular pressure (IOP) within acceptable range relative to target IOP and no evidence of progression. Continue current treatment. Reviewed importance of continued compliance with treatment and follow up.      Patient instructed to continue using the following glaucoma medication as follows:  Latanoprost one drop in each eye nightly and Dorzolamide/Timolol (Cosopt) one drop in each eye every 12 hours    Return to clinic in 4 months  or as needed.  With Dilation     2. Pseudophakia  -- Condition stable, no therapeutic change required. Monitoring routinely.

## 2024-07-06 DIAGNOSIS — H40.1132 PRIMARY OPEN ANGLE GLAUCOMA (POAG) OF BOTH EYES, MODERATE STAGE: ICD-10-CM

## 2024-07-08 RX ORDER — LATANOPROST 50 UG/ML
1 SOLUTION/ DROPS OPHTHALMIC
Qty: 3 ML | Refills: 3 | Status: SHIPPED | OUTPATIENT
Start: 2024-07-08

## 2024-07-20 DIAGNOSIS — H40.1132 PRIMARY OPEN ANGLE GLAUCOMA (POAG) OF BOTH EYES, MODERATE STAGE: ICD-10-CM

## 2024-07-22 RX ORDER — DORZOLAMIDE HYDROCHLORIDE AND TIMOLOL MALEATE 20; 5 MG/ML; MG/ML
SOLUTION/ DROPS OPHTHALMIC
Qty: 20 ML | Refills: 3 | Status: SHIPPED | OUTPATIENT
Start: 2024-07-22